# Patient Record
Sex: MALE | Race: WHITE | NOT HISPANIC OR LATINO | Employment: FULL TIME | URBAN - METROPOLITAN AREA
[De-identification: names, ages, dates, MRNs, and addresses within clinical notes are randomized per-mention and may not be internally consistent; named-entity substitution may affect disease eponyms.]

---

## 2018-06-05 ENCOUNTER — OFFICE VISIT (OUTPATIENT)
Dept: PULMONOLOGY | Facility: CLINIC | Age: 63
End: 2018-06-05
Payer: COMMERCIAL

## 2018-06-05 ENCOUNTER — HOSPITAL ENCOUNTER (OUTPATIENT)
Dept: CARDIOLOGY | Facility: CLINIC | Age: 63
Discharge: HOME | End: 2018-06-05
Payer: COMMERCIAL

## 2018-06-05 ENCOUNTER — CLINICAL SUPPORT (OUTPATIENT)
Dept: INTERNAL MEDICINE | Facility: CLINIC | Age: 63
End: 2018-06-05
Payer: COMMERCIAL

## 2018-06-05 VITALS
DIASTOLIC BLOOD PRESSURE: 73 MMHG | HEART RATE: 74 BPM | WEIGHT: 207 LBS | HEIGHT: 69 IN | BODY MASS INDEX: 30.66 KG/M2 | SYSTOLIC BLOOD PRESSURE: 109 MMHG

## 2018-06-05 DIAGNOSIS — Z00.00 ROUTINE GENERAL MEDICAL EXAMINATION AT A HEALTH CARE FACILITY: Primary | ICD-10-CM

## 2018-06-05 DIAGNOSIS — Z00.00 ANNUAL PHYSICAL EXAM: Primary | ICD-10-CM

## 2018-06-05 DIAGNOSIS — Z00.00 ROUTINE GENERAL MEDICAL EXAMINATION AT A HEALTH CARE FACILITY: ICD-10-CM

## 2018-06-05 DIAGNOSIS — E03.9 ACQUIRED HYPOTHYROIDISM: ICD-10-CM

## 2018-06-05 DIAGNOSIS — N52.01 ERECTILE DYSFUNCTION DUE TO ARTERIAL INSUFFICIENCY: ICD-10-CM

## 2018-06-05 LAB
ALBUMIN SERPL BCP-MCNC: 4.1 G/DL
ALP SERPL-CCNC: 59 U/L
ALT SERPL W/O P-5'-P-CCNC: 13 U/L
ANION GAP SERPL CALC-SCNC: 8 MMOL/L
AST SERPL-CCNC: 13 U/L
BILIRUB SERPL-MCNC: 0.9 MG/DL
BUN SERPL-MCNC: 13 MG/DL
CALCIUM SERPL-MCNC: 9.4 MG/DL
CHLORIDE SERPL-SCNC: 107 MMOL/L
CHOLEST SERPL-MCNC: 219 MG/DL
CHOLEST/HDLC SERPL: 4.2 {RATIO}
CO2 SERPL-SCNC: 25 MMOL/L
COMPLEXED PSA SERPL-MCNC: 1.1 NG/ML
CREAT SERPL-MCNC: 1 MG/DL
ERYTHROCYTE [DISTWIDTH] IN BLOOD BY AUTOMATED COUNT: 12.5 %
EST. GFR  (AFRICAN AMERICAN): >60 ML/MIN/1.73 M^2
EST. GFR  (NON AFRICAN AMERICAN): >60 ML/MIN/1.73 M^2
ESTIMATED AVG GLUCOSE: 100 MG/DL
GLUCOSE SERPL-MCNC: 99 MG/DL
HBA1C MFR BLD HPLC: 5.1 %
HCT VFR BLD AUTO: 42.3 %
HDLC SERPL-MCNC: 52 MG/DL
HDLC SERPL: 23.7 %
HGB BLD-MCNC: 14.8 G/DL
LDLC SERPL CALC-MCNC: 147.2 MG/DL
MCH RBC QN AUTO: 32.5 PG
MCHC RBC AUTO-ENTMCNC: 35 G/DL
MCV RBC AUTO: 93 FL
NONHDLC SERPL-MCNC: 167 MG/DL
PLATELET # BLD AUTO: 223 K/UL
PMV BLD AUTO: 9.4 FL
POTASSIUM SERPL-SCNC: 4.4 MMOL/L
PROT SERPL-MCNC: 7.1 G/DL
RBC # BLD AUTO: 4.56 M/UL
SODIUM SERPL-SCNC: 140 MMOL/L
TRIGL SERPL-MCNC: 99 MG/DL
TSH SERPL DL<=0.005 MIU/L-ACNC: 1.89 UIU/ML
URATE SERPL-MCNC: 8.6 MG/DL
WBC # BLD AUTO: 5.22 K/UL

## 2018-06-05 PROCEDURE — 83036 HEMOGLOBIN GLYCOSYLATED A1C: CPT

## 2018-06-05 PROCEDURE — 85027 COMPLETE CBC AUTOMATED: CPT

## 2018-06-05 PROCEDURE — 99396 PREV VISIT EST AGE 40-64: CPT | Mod: S$GLB,,, | Performed by: INTERNAL MEDICINE

## 2018-06-05 PROCEDURE — 80053 COMPREHEN METABOLIC PANEL: CPT

## 2018-06-05 PROCEDURE — 84153 ASSAY OF PSA TOTAL: CPT

## 2018-06-05 PROCEDURE — 84443 ASSAY THYROID STIM HORMONE: CPT

## 2018-06-05 PROCEDURE — 93000 ELECTROCARDIOGRAM COMPLETE: CPT | Mod: S$GLB,,, | Performed by: INTERNAL MEDICINE

## 2018-06-05 PROCEDURE — 80061 LIPID PANEL: CPT

## 2018-06-05 PROCEDURE — 84550 ASSAY OF BLOOD/URIC ACID: CPT

## 2018-06-05 PROCEDURE — 99999 PR PBB SHADOW E&M-EST. PATIENT-LVL III: CPT | Mod: PBBFAC,,, | Performed by: INTERNAL MEDICINE

## 2018-06-05 RX ORDER — SILDENAFIL 100 MG/1
100 TABLET, FILM COATED ORAL DAILY PRN
Qty: 12 TABLET | Refills: 3 | Status: ON HOLD | OUTPATIENT
Start: 2018-06-05 | End: 2020-04-05 | Stop reason: HOSPADM

## 2018-06-05 NOTE — LETTER
June 5, 2018    Rashawn SLADE Suhas  [address]  [facility] [address]             [facility]  [address]  [facility] [address]  Phone: [phone] Dear Rene,       Thank you for allowing me to serve you and perform your [facility] exam on 6/5/2018. This letter will serve as a brief summary of the physical findings and laboratory/studies performed and recommendations at this time. Except for a mild elevation of the cholesterol this is a normal exam. Try the viagra and if you prefer and I can switch you to low dose daily cialis.         If you have any questions or concerns, please don't hesitate to call.    Sincerely,        Jax Rodriguez MD

## 2018-06-05 NOTE — PROGRESS NOTES
Subjective:       Patient ID: Rashawn Dai is a 62 y.o. male.    Chief Complaint: Annual Exam    HPI  63 yo quan bang with [employer] comes for his bi annual health exam. He has had two self limited bouts of gout in his foot. Responded to a steroid injection. Not on maintenance treatment. No other medical complaints.   Review of Systems   Constitutional: Negative.    HENT: Negative.    Eyes: Negative.    Respiratory: Negative.    Cardiovascular: Negative.    Gastrointestinal: Negative.    Endocrine:        Chronic synthroid replacement   Genitourinary: Negative.         Mild ED will give a trial of viagra, He may prefer low dose cialis.   Musculoskeletal: Negative.         2 attacks of mild gout.   Skin: Negative.    Neurological: Negative.    Psychiatric/Behavioral: Negative.    All other systems reviewed and are negative.      Objective:      Physical Exam   Constitutional: He is oriented to person, place, and time. He appears well-developed and well-nourished.   HENT:   Head: Normocephalic and atraumatic.   Right Ear: External ear normal.   Left Ear: External ear normal.   Eyes: Conjunctivae and EOM are normal. Pupils are equal, round, and reactive to light.   Neck: Normal range of motion. Neck supple.   Cardiovascular: Normal rate, regular rhythm and normal heart sounds.    Pulmonary/Chest: Effort normal and breath sounds normal.   Peak flow 600 l/min   Abdominal: Soft. Bowel sounds are normal.   Musculoskeletal: Normal range of motion.   Neurological: He is alert and oriented to person, place, and time. He has normal reflexes.   Skin: Skin is warm and dry.   Psychiatric: He has a normal mood and affect. His behavior is normal. Judgment and thought content normal.       Assessment:       1. Erectile dysfunction due to arterial insufficiency    2. Acquired hypothyroidism        Plan:        Labs: Cholesterol: 219, all other parameters are normal.  IMP: Mild type II hyperlipidemia

## 2018-06-06 DIAGNOSIS — E03.9 ACQUIRED HYPOTHYROIDISM: ICD-10-CM

## 2018-06-07 RX ORDER — LEVOTHYROXINE SODIUM 100 UG/1
TABLET ORAL
Qty: 30 TABLET | Refills: 10 | Status: SHIPPED | OUTPATIENT
Start: 2018-06-07 | End: 2020-07-22

## 2020-03-31 ENCOUNTER — HOSPITAL ENCOUNTER (INPATIENT)
Facility: HOSPITAL | Age: 65
LOS: 5 days | Discharge: HOME | DRG: 177 | End: 2020-04-05
Attending: EMERGENCY MEDICINE | Admitting: INTERNAL MEDICINE
Payer: COMMERCIAL

## 2020-03-31 DIAGNOSIS — I48.91 A-FIB: ICD-10-CM

## 2020-03-31 DIAGNOSIS — R06.02 SOB (SHORTNESS OF BREATH): ICD-10-CM

## 2020-03-31 DIAGNOSIS — J18.9 ATYPICAL PNEUMONIA: Primary | ICD-10-CM

## 2020-03-31 DIAGNOSIS — U07.1 COVID-19 VIRUS INFECTION: ICD-10-CM

## 2020-03-31 DIAGNOSIS — R07.9 CHEST PAIN: ICD-10-CM

## 2020-03-31 DIAGNOSIS — I48.91 ATRIAL FIBRILLATION: ICD-10-CM

## 2020-03-31 DIAGNOSIS — R00.0 SINUS TACHYCARDIA: ICD-10-CM

## 2020-03-31 DIAGNOSIS — I48.91 ATRIAL FIBRILLATION WITH RVR: ICD-10-CM

## 2020-03-31 DIAGNOSIS — R00.0 TACHYCARDIA: ICD-10-CM

## 2020-03-31 PROBLEM — Z20.822 SUSPECTED COVID-19 VIRUS INFECTION: Status: ACTIVE | Noted: 2020-03-31

## 2020-03-31 LAB
ALBUMIN SERPL BCP-MCNC: 3.4 G/DL (ref 3.5–5.2)
ALP SERPL-CCNC: 67 U/L (ref 55–135)
ALT SERPL W/O P-5'-P-CCNC: 65 U/L (ref 10–44)
ANION GAP SERPL CALC-SCNC: 15 MMOL/L (ref 8–16)
AST SERPL-CCNC: 83 U/L (ref 10–40)
BASOPHILS # BLD AUTO: 0.02 K/UL (ref 0–0.2)
BASOPHILS NFR BLD: 0.2 % (ref 0–1.9)
BILIRUB SERPL-MCNC: 1 MG/DL (ref 0.1–1)
BUN SERPL-MCNC: 19 MG/DL (ref 8–23)
CALCIUM SERPL-MCNC: 8.7 MG/DL (ref 8.7–10.5)
CHLORIDE SERPL-SCNC: 94 MMOL/L (ref 95–110)
CO2 SERPL-SCNC: 22 MMOL/L (ref 23–29)
CREAT SERPL-MCNC: 1.2 MG/DL (ref 0.5–1.4)
CRP SERPL-MCNC: 25.08 MG/DL (ref 0–0.75)
D DIMER PPP IA.FEU-MCNC: 1.25 UG/ML FEU
DIFFERENTIAL METHOD: ABNORMAL
EOSINOPHIL # BLD AUTO: 0 K/UL (ref 0–0.5)
EOSINOPHIL NFR BLD: 0 % (ref 0–8)
ERYTHROCYTE [DISTWIDTH] IN BLOOD BY AUTOMATED COUNT: 11.7 % (ref 11.5–14.5)
EST. GFR  (AFRICAN AMERICAN): >60 ML/MIN/1.73 M^2
EST. GFR  (NON AFRICAN AMERICAN): >60 ML/MIN/1.73 M^2
FERRITIN SERPL-MCNC: 3143 NG/ML (ref 20–300)
GLUCOSE SERPL-MCNC: 148 MG/DL (ref 70–110)
HCT VFR BLD AUTO: 43.5 % (ref 40–54)
HGB BLD-MCNC: 15.7 G/DL (ref 14–18)
IMM GRANULOCYTES # BLD AUTO: 0.2 K/UL (ref 0–0.04)
IMM GRANULOCYTES NFR BLD AUTO: 1.7 % (ref 0–0.5)
INR PPP: 1.1
LACTATE SERPL-SCNC: 2 MMOL/L (ref 0.5–1.9)
LYMPHOCYTES # BLD AUTO: 0.6 K/UL (ref 1–4.8)
LYMPHOCYTES NFR BLD: 5 % (ref 18–48)
MAGNESIUM SERPL-MCNC: 2 MG/DL (ref 1.6–2.6)
MCH RBC QN AUTO: 33.2 PG (ref 27–31)
MCHC RBC AUTO-ENTMCNC: 36.1 G/DL (ref 32–36)
MCV RBC AUTO: 92 FL (ref 82–98)
MONOCYTES # BLD AUTO: 0.3 K/UL (ref 0.3–1)
MONOCYTES NFR BLD: 2.4 % (ref 4–15)
NEUTROPHILS # BLD AUTO: 10.6 K/UL (ref 1.8–7.7)
NEUTROPHILS NFR BLD: 90.7 % (ref 38–73)
NRBC BLD-RTO: 0 /100 WBC
PLATELET # BLD AUTO: 298 K/UL (ref 150–350)
PMV BLD AUTO: 9.7 FL (ref 9.2–12.9)
POTASSIUM SERPL-SCNC: 3.3 MMOL/L (ref 3.5–5.1)
PROCALCITONIN SERPL IA-MCNC: 0.87 NG/ML (ref 0–0.5)
PROT SERPL-MCNC: 7.9 G/DL (ref 6–8.4)
PROTHROMBIN TIME: 13.6 SEC (ref 10.6–14.8)
RBC # BLD AUTO: 4.73 M/UL (ref 4.6–6.2)
SODIUM SERPL-SCNC: 131 MMOL/L (ref 136–145)
TROPONIN I SERPL DL<=0.01 NG/ML-MCNC: <0.03 NG/ML
WBC # BLD AUTO: 11.65 K/UL (ref 3.9–12.7)

## 2020-03-31 PROCEDURE — 87209 SMEAR COMPLEX STAIN: CPT

## 2020-03-31 PROCEDURE — 63600175 PHARM REV CODE 636 W HCPCS: Performed by: EMERGENCY MEDICINE

## 2020-03-31 PROCEDURE — 85379 FIBRIN DEGRADATION QUANT: CPT

## 2020-03-31 PROCEDURE — 63600175 PHARM REV CODE 636 W HCPCS: Performed by: NURSE PRACTITIONER

## 2020-03-31 PROCEDURE — 80053 COMPREHEN METABOLIC PANEL: CPT

## 2020-03-31 PROCEDURE — 83735 ASSAY OF MAGNESIUM: CPT

## 2020-03-31 PROCEDURE — 87040 BLOOD CULTURE FOR BACTERIA: CPT

## 2020-03-31 PROCEDURE — 85610 PROTHROMBIN TIME: CPT

## 2020-03-31 PROCEDURE — 25000003 PHARM REV CODE 250: Performed by: EMERGENCY MEDICINE

## 2020-03-31 PROCEDURE — 87324 CLOSTRIDIUM AG IA: CPT

## 2020-03-31 PROCEDURE — 25000003 PHARM REV CODE 250: Performed by: INTERNAL MEDICINE

## 2020-03-31 PROCEDURE — 93005 ELECTROCARDIOGRAM TRACING: CPT | Performed by: INTERNAL MEDICINE

## 2020-03-31 PROCEDURE — 94761 N-INVAS EAR/PLS OXIMETRY MLT: CPT

## 2020-03-31 PROCEDURE — U0002 COVID-19 LAB TEST NON-CDC: HCPCS

## 2020-03-31 PROCEDURE — 87046 STOOL CULTR AEROBIC BACT EA: CPT | Mod: 59

## 2020-03-31 PROCEDURE — 20000000 HC ICU ROOM

## 2020-03-31 PROCEDURE — 86140 C-REACTIVE PROTEIN: CPT

## 2020-03-31 PROCEDURE — 84484 ASSAY OF TROPONIN QUANT: CPT

## 2020-03-31 PROCEDURE — 83605 ASSAY OF LACTIC ACID: CPT

## 2020-03-31 PROCEDURE — 87449 NOS EACH ORGANISM AG IA: CPT

## 2020-03-31 PROCEDURE — 87045 FECES CULTURE AEROBIC BACT: CPT

## 2020-03-31 PROCEDURE — 36415 COLL VENOUS BLD VENIPUNCTURE: CPT

## 2020-03-31 PROCEDURE — 89055 LEUKOCYTE ASSESSMENT FECAL: CPT

## 2020-03-31 PROCEDURE — 96365 THER/PROPH/DIAG IV INF INIT: CPT

## 2020-03-31 PROCEDURE — 84145 PROCALCITONIN (PCT): CPT

## 2020-03-31 PROCEDURE — 87015 SPECIMEN INFECT AGNT CONCNTJ: CPT

## 2020-03-31 PROCEDURE — 82728 ASSAY OF FERRITIN: CPT

## 2020-03-31 PROCEDURE — 63700000 PHARM REV CODE 250 ALT 637 W/O HCPCS: Performed by: INTERNAL MEDICINE

## 2020-03-31 PROCEDURE — 85025 COMPLETE CBC W/AUTO DIFF WBC: CPT

## 2020-03-31 PROCEDURE — 99285 EMERGENCY DEPT VISIT HI MDM: CPT | Mod: 25

## 2020-03-31 PROCEDURE — 63700000 PHARM REV CODE 250 ALT 637 W/O HCPCS: Performed by: EMERGENCY MEDICINE

## 2020-03-31 RX ORDER — LANOLIN ALCOHOL/MO/W.PET/CERES
800 CREAM (GRAM) TOPICAL
Status: DISCONTINUED | OUTPATIENT
Start: 2020-03-31 | End: 2020-04-05 | Stop reason: HOSPADM

## 2020-03-31 RX ORDER — POTASSIUM CHLORIDE 20 MEQ/15ML
40 SOLUTION ORAL
Status: DISCONTINUED | OUTPATIENT
Start: 2020-03-31 | End: 2020-04-05 | Stop reason: HOSPADM

## 2020-03-31 RX ORDER — HYDROXYCHLOROQUINE SULFATE 200 MG/1
400 TABLET, FILM COATED ORAL 2 TIMES DAILY
Status: COMPLETED | OUTPATIENT
Start: 2020-03-31 | End: 2020-03-31

## 2020-03-31 RX ORDER — AZITHROMYCIN 250 MG/1
500 TABLET, FILM COATED ORAL DAILY
Status: ON HOLD | COMMUNITY
Start: 2020-03-30 | End: 2020-04-05 | Stop reason: HOSPADM

## 2020-03-31 RX ORDER — ENOXAPARIN SODIUM 100 MG/ML
40 INJECTION SUBCUTANEOUS EVERY 24 HOURS
Status: DISCONTINUED | OUTPATIENT
Start: 2020-03-31 | End: 2020-04-04

## 2020-03-31 RX ORDER — SODIUM,POTASSIUM PHOSPHATES 280-250MG
2 POWDER IN PACKET (EA) ORAL
Status: DISCONTINUED | OUTPATIENT
Start: 2020-03-31 | End: 2020-04-05 | Stop reason: HOSPADM

## 2020-03-31 RX ORDER — METOPROLOL TARTRATE 1 MG/ML
5 INJECTION, SOLUTION INTRAVENOUS
Status: COMPLETED | OUTPATIENT
Start: 2020-03-31 | End: 2020-03-31

## 2020-03-31 RX ORDER — AZITHROMYCIN 250 MG/1
500 TABLET, FILM COATED ORAL
Status: COMPLETED | OUTPATIENT
Start: 2020-03-31 | End: 2020-03-31

## 2020-03-31 RX ORDER — ACETAMINOPHEN 325 MG/1
650 TABLET ORAL EVERY 8 HOURS PRN
Status: DISCONTINUED | OUTPATIENT
Start: 2020-03-31 | End: 2020-04-05 | Stop reason: HOSPADM

## 2020-03-31 RX ORDER — ACETAMINOPHEN 325 MG/1
650 TABLET ORAL EVERY 4 HOURS PRN
Status: DISCONTINUED | OUTPATIENT
Start: 2020-03-31 | End: 2020-03-31

## 2020-03-31 RX ORDER — BENZONATATE 100 MG/1
100 CAPSULE ORAL 3 TIMES DAILY PRN
COMMUNITY

## 2020-03-31 RX ORDER — POTASSIUM CHLORIDE 20 MEQ/15ML
40 SOLUTION ORAL
Status: COMPLETED | OUTPATIENT
Start: 2020-03-31 | End: 2020-03-31

## 2020-03-31 RX ORDER — ONDANSETRON 2 MG/ML
4 INJECTION INTRAMUSCULAR; INTRAVENOUS EVERY 6 HOURS PRN
Status: DISCONTINUED | OUTPATIENT
Start: 2020-03-31 | End: 2020-04-05 | Stop reason: HOSPADM

## 2020-03-31 RX ORDER — POTASSIUM CHLORIDE 20 MEQ/15ML
40 SOLUTION ORAL ONCE
Status: COMPLETED | OUTPATIENT
Start: 2020-03-31 | End: 2020-04-01

## 2020-03-31 RX ORDER — NITAZOXANIDE 500 MG/1
500 TABLET ORAL EVERY 12 HOURS
Status: COMPLETED | OUTPATIENT
Start: 2020-03-31 | End: 2020-04-03

## 2020-03-31 RX ORDER — HYDROXYCHLOROQUINE SULFATE 200 MG/1
400 TABLET, FILM COATED ORAL DAILY
Status: DISCONTINUED | OUTPATIENT
Start: 2020-04-01 | End: 2020-04-05 | Stop reason: HOSPADM

## 2020-03-31 RX ORDER — AZITHROMYCIN 250 MG/1
250 TABLET, FILM COATED ORAL DAILY
Status: COMPLETED | OUTPATIENT
Start: 2020-04-01 | End: 2020-04-04

## 2020-03-31 RX ORDER — SODIUM CHLORIDE 0.9 % (FLUSH) 0.9 %
10 SYRINGE (ML) INJECTION
Status: DISCONTINUED | OUTPATIENT
Start: 2020-03-31 | End: 2020-04-05 | Stop reason: HOSPADM

## 2020-03-31 RX ORDER — ZOLPIDEM TARTRATE 5 MG/1
5 TABLET ORAL NIGHTLY PRN
Status: DISCONTINUED | OUTPATIENT
Start: 2020-03-31 | End: 2020-04-05 | Stop reason: HOSPADM

## 2020-03-31 RX ORDER — SODIUM CHLORIDE 9 MG/ML
500 INJECTION, SOLUTION INTRAVENOUS
Status: COMPLETED | OUTPATIENT
Start: 2020-03-31 | End: 2020-03-31

## 2020-03-31 RX ORDER — ACETAMINOPHEN 325 MG/1
650 TABLET ORAL
Status: COMPLETED | OUTPATIENT
Start: 2020-03-31 | End: 2020-03-31

## 2020-03-31 RX ORDER — LEVOTHYROXINE SODIUM 100 UG/1
100 TABLET ORAL
Status: DISCONTINUED | OUTPATIENT
Start: 2020-04-01 | End: 2020-04-05 | Stop reason: HOSPADM

## 2020-03-31 RX ADMIN — ACETAMINOPHEN 650 MG: 325 TABLET ORAL at 02:03

## 2020-03-31 RX ADMIN — SODIUM CHLORIDE 1000 ML: 9 INJECTION, SOLUTION INTRAVENOUS at 02:03

## 2020-03-31 RX ADMIN — CEFTRIAXONE 2 G: 2 INJECTION, SOLUTION INTRAVENOUS at 05:03

## 2020-03-31 RX ADMIN — NITAZOXANIDE 500 MG: 500 TABLET ORAL at 08:03

## 2020-03-31 RX ADMIN — SODIUM CHLORIDE 500 ML: 0.9 INJECTION, SOLUTION INTRAVENOUS at 12:03

## 2020-03-31 RX ADMIN — METOPROLOL TARTRATE 5 MG: 1 INJECTION, SOLUTION INTRAVENOUS at 04:03

## 2020-03-31 RX ADMIN — HYDROXYCHLOROQUINE SULFATE 400 MG: 200 TABLET, FILM COATED ORAL at 08:03

## 2020-03-31 RX ADMIN — HYDROXYCHLOROQUINE SULFATE 400 MG: 200 TABLET, FILM COATED ORAL at 01:03

## 2020-03-31 RX ADMIN — POTASSIUM CHLORIDE 40 MEQ: 20 SOLUTION ORAL at 02:03

## 2020-03-31 RX ADMIN — ZOLPIDEM TARTRATE 5 MG: 5 TABLET ORAL at 09:03

## 2020-03-31 RX ADMIN — AZITHROMYCIN MONOHYDRATE 500 MG: 250 TABLET ORAL at 12:03

## 2020-03-31 RX ADMIN — CEFTRIAXONE 1 G: 1 INJECTION, SOLUTION INTRAVENOUS at 12:03

## 2020-03-31 RX ADMIN — ENOXAPARIN SODIUM 40 MG: 100 INJECTION SUBCUTANEOUS at 08:03

## 2020-03-31 NOTE — ED NOTES
DR FIELDS MADE AWARE OF CHRISTIAN, HR, TEMP, DIARRHEA X 8 DAYS, PT C/O EXTREME THIRST.  TO GIVE 650 MG PO TYLENOL; SEE EMAR.

## 2020-03-31 NOTE — ED PROVIDER NOTES
Encounter Date: 3/31/2020       History     Chief Complaint   Patient presents with    Headache     X 1 WEEK, POS SCREEN    Diarrhea    Shortness of Breath     TODAY    Fever     Patient presents complaining of headache, fever, shortness of breath ongoing for the last 1 week.  Patient persisting with fever body aches and cough today with associated shortness of breath.  Patient has been taking Tylenol for fever.  At the worst symptoms are moderate.  He has not had this before.        Review of patient's allergies indicates:   Allergen Reactions    Ibuprofen Hives     Past Medical History:   Diagnosis Date    Thyroid disease      No past surgical history on file.  Family History   Problem Relation Age of Onset    Heart failure Mother     Cancer Father      Social History     Tobacco Use    Smoking status: Never Smoker    Smokeless tobacco: Never Used   Substance Use Topics    Alcohol use: Yes     Alcohol/week: 23.5 standard drinks     Types: 21 Glasses of wine, 3 Standard drinks or equivalent per week    Drug use: No     Review of Systems   Constitutional: Positive for fever.   Respiratory: Positive for cough and shortness of breath.    All other systems reviewed and are negative.      Physical Exam     Initial Vitals [03/31/20 1053]   BP Pulse Resp Temp SpO2   135/88 (!) 123 (!) 24 (!) 103.3 °F (39.6 °C) 95 %      MAP       --         Physical Exam    Nursing note and vitals reviewed.  Constitutional: He appears well-developed and well-nourished. He is not diaphoretic. No distress.   Appears weak   HENT:   Head: Normocephalic and atraumatic.   Eyes: EOM are normal.   Neck: Normal range of motion. Neck supple.   Cardiovascular: Normal rate, regular rhythm, normal heart sounds and intact distal pulses.   Pulmonary/Chest: No respiratory distress. He has wheezes.   Abdominal: Soft.   Musculoskeletal: Normal range of motion.   Neurological: He is alert and oriented to person, place, and time.   Skin: Skin is  warm and dry.   Psychiatric: He has a normal mood and affect. His behavior is normal. Judgment and thought content normal.         ED Course   Procedures  Labs Reviewed   COMPREHENSIVE METABOLIC PANEL - Abnormal; Notable for the following components:       Result Value    Sodium 131 (*)     Potassium 3.3 (*)     Chloride 94 (*)     CO2 22 (*)     Glucose 148 (*)     Albumin 3.4 (*)     AST 83 (*)     ALT 65 (*)     All other components within normal limits   CBC W/ AUTO DIFFERENTIAL - Abnormal; Notable for the following components:    Mean Corpuscular Hemoglobin 33.2 (*)     Mean Corpuscular Hemoglobin Conc 36.1 (*)     Immature Granulocytes 1.7 (*)     Gran # (ANC) 10.6 (*)     Immature Grans (Abs) 0.20 (*)     Lymph # 0.6 (*)     Gran% 90.7 (*)     Lymph% 5.0 (*)     Mono% 2.4 (*)     All other components within normal limits   LACTIC ACID, PLASMA - Abnormal; Notable for the following components:    Lactate (Lactic Acid) 2.0 (*)     All other components within normal limits    Narrative:     Lactic Acid critical result(s) called and verbal readback obtained   from [facility]  by KB5 03/31/2020 13:15   CULTURE, BLOOD   CULTURE, BLOOD   MAGNESIUM   TROPONIN I   PROTIME-INR   SARS-COV-2 (COVID-19) QUALITATIVE PCR   PROCALCITONIN        ECG Results          EKG 12-LEAD (In process)  Result time 03/31/20 12:57:36    In process by Interface, Lab In [facility] (03/31/20 12:57:36)                 Narrative:    Test Reason : R07.9,    Vent. Rate : 115 BPM     Atrial Rate : 214 BPM     P-R Int : 000 ms          QRS Dur : 084 ms      QT Int : 300 ms       P-R-T Axes : 000 -03 -50 degrees     QTc Int : 415 ms    Atrial fibrillation with rapid ventricular response  Inferior infarct ,age undetermined  Abnormal ECG  When compared with ECG of 05-JUN-2018 08:05,  Atrial fibrillation has replaced Sinus rhythm  Vent. rate has increased BY  44 BPM  Non-specific change in ST segment in Inferior leads    Referred By: [email or web address]    SELF           Confirmed By:                             Imaging Results          X-Ray Chest AP Portable (Final result)  Result time 03/31/20 11:12:34    Final result by Jax Cook MD (03/31/20 11:12:34)                 Impression:      Right greater than left, mixed interstitial and alveolar opacities at the lung bases suggesting multifocal pneumonia (and less likely edema, ARDS or aspiration).  Consider radiographic follow-up to document resolution.    RESULT NOTIFICATION: These observations were discussed by the dictating physician, by phone with, and acknowledged by Javy Talavera at 11:09 on 3/31/2020.      Electronically signed by: Jax Cook MD  Date:    03/31/2020  Time:    11:12             Narrative:    CLINICAL HISTORY:  ([id number])65 y/o  (1955) M    Shortness of breath    TECHNIQUE:  (A#[id number], exam time 3/31/2020 11:06)    XR CHEST AP PORTABLE [id number]    COMPARISON:  Radiograph most recently from 09/22/2015    FINDINGS:  Patchy airspace opacity in the right mid to lower lung zone and interstitial opacities in the left mid to lower lung zone (ground-glass).  Costophrenic angles are seen without effusion. No pneumothorax is identified. The heart is top normal in size.  Osseous structures appear within normal limits. The visualized upper abdomen is unremarkable.                                 Medical Decision Making:   ED Management:  Patient likely with corona virus.  Patient has atypical pneumonia on chest x-ray.  Patient febrile on arrival and tachycardic.  Initial Tylenol was not given upon arrival patient will be receiving it now.  Initial 500 L of fluid has been given to patient as well as Rocephin azithromycin.  Patient will require admission into the hospital secondary to bilateral pneumonia, high risk of deterioration, tachycardia, fever.                                 Clinical Impression:       ICD-10-CM ICD-9-CM   1. Atypical pneumonia J18.9 486   2. SOB (shortness of  breath) R06.02 786.05   3. Chest pain R07.9 786.50             ED Disposition Condition    Admit                           Javy Talavera MD  03/31/20 2089

## 2020-03-31 NOTE — ASSESSMENT & PLAN NOTE
Admit to ICU  Consult ID  IV Azithromycin/Rocephin  Hydroxychloroquine  Supplemental Oxygen  Lovenox for VTE prophylaxis  Suspected Covid-19 virus; test pending

## 2020-03-31 NOTE — SUBJECTIVE & OBJECTIVE
Past Medical History:   Diagnosis Date    Thyroid disease        No past surgical history on file.    Review of patient's allergies indicates:   Allergen Reactions    Ibuprofen Hives       No current facility-administered medications on file prior to encounter.      Current Outpatient Medications on File Prior to Encounter   Medication Sig    azithromycin (ZITHROMAX Z-DEANGELO) 250 MG tablet Take 500 mg by mouth once daily.    benzonatate (TESSALON) 100 MG capsule Take 100 mg by mouth 3 (three) times daily as needed for Cough.    SYNTHROID 100 mcg tablet TAKE 1 TABLET EVERY DAY (Patient taking differently: Take 100 mcg by mouth before breakfast. )    sildenafil (VIAGRA) 100 MG tablet Take 1 tablet (100 mg total) by mouth daily as needed for Erectile Dysfunction.     Family History     Problem Relation (Age of Onset)    Cancer Father    Heart failure Mother        Tobacco Use    Smoking status: Never Smoker    Smokeless tobacco: Never Used   Substance and Sexual Activity    Alcohol use: Yes     Alcohol/week: 23.5 standard drinks     Types: 21 Glasses of wine, 3 Standard drinks or equivalent per week    Drug use: No    Sexual activity: Not on file     Review of Systems   Constitutional: Positive for activity change, appetite change, chills, fatigue and fever.   Respiratory: Positive for cough and shortness of breath.    Gastrointestinal: Positive for diarrhea.   Genitourinary: Negative for difficulty urinating and dysuria.   Musculoskeletal: Positive for myalgias.   Neurological: Positive for dizziness and headaches. Negative for seizures, speech difficulty and numbness.   Psychiatric/Behavioral: Negative for agitation and confusion.     Objective:     Vital Signs (Most Recent):  Temp: 100 °F (37.8 °C) (03/31/20 1445)  Pulse: (!) 124 (03/31/20 1445)  Resp: (!) 33 (03/31/20 1445)  BP: 128/74 (03/31/20 1400)  SpO2: (!) 93 % (03/31/20 1445) Vital Signs (24h Range):  Temp:  [100 °F (37.8 °C)-103.3 °F (39.6 °C)] 100  °F (37.8 °C)  Pulse:  [112-126] 124  Resp:  [22-33] 33  SpO2:  [92 %-96 %] 93 %  BP: (121-138)/(71-92) 128/74     Weight: 93.9 kg (207 lb)  Body mass index is 30.57 kg/m².    Physical Exam   Constitutional: He is oriented to person, place, and time. He appears well-developed and well-nourished.   HENT:   Head: Normocephalic.   Eyes: Pupils are equal, round, and reactive to light. Conjunctivae and lids are normal.   Neck: Trachea normal and normal range of motion. Neck supple.   Cardiovascular: Normal heart sounds. Tachycardia present.   Pulmonary/Chest: No accessory muscle usage. Tachypnea noted. He has wheezes.   Abdominal: Soft. Bowel sounds are normal.   Musculoskeletal: Normal range of motion.   Neurological: He is alert and oriented to person, place, and time.   Skin: Skin is warm and dry.   Psychiatric: He has a normal mood and affect. His behavior is normal.         CRANIAL NERVES     CN III, IV, VI   Pupils are equal, round, and reactive to light.       Significant Labs:   Blood Culture: No results for input(s): LABBLOO in the last 48 hours.  CBC:   Recent Labs   Lab 03/31/20  1125   WBC 11.65   HGB 15.7   HCT 43.5        CMP:   Recent Labs   Lab 03/31/20  1125   *   K 3.3*   CL 94*   CO2 22*   *   BUN 19   CREATININE 1.2   CALCIUM 8.7   PROT 7.9   ALBUMIN 3.4*   BILITOT 1.0   ALKPHOS 67   AST 83*   ALT 65*   ANIONGAP 15   EGFRNONAA >60.0     Cardiac Markers: No results for input(s): CKMB, MYOGLOBIN, BNP, TROPISTAT in the last 48 hours.  Lactic Acid:   Recent Labs   Lab 03/31/20  1125   LACTATE 2.0*     Troponin:   Recent Labs   Lab 03/31/20  1125   TROPONINI <0.030       Significant Imaging: CXR: I have reviewed all pertinent results/findings within the past 24 hours and my personal findings are:  bilateral infiltrates  I have reviewed and interpreted all pertinent imaging results/findings within the past 24 hours.

## 2020-03-31 NOTE — ASSESSMENT & PLAN NOTE
Likely secondary to dehydration  Repeat EKG now to clarify ST vs Afib with RVR  500cc bolus in the ED; additional 1L bolus now  Continuous cardiac monitoring

## 2020-03-31 NOTE — H&P
[facility] Medicine  History & Physical    Patient Name: Rashawn Dai  MRN: [id number]  Admission Date: 3/31/2020  Attending Physician: Dr. Mcguire  Primary Care Provider: Silver Rodriguez MD         Patient information was obtained from patient and ER records.     Subjective:     Principal Problem:Atypical pneumonia    Chief Complaint:   Chief Complaint   Patient presents with    Headache     X 1 WEEK, POS SCREEN    Diarrhea    Shortness of Breath     TODAY    Fever        HPI: History was obtained from the patient and ER physician Sign-out. Patient presented with headache, cough, shortness of breath and fever x 9 days. His symptoms are progressively getting worst. Other associated symptoms include diarrhea and fatigue. Denies any alleviating or worsening factors. He is a  and reports he has come into contact with coworkers who tested positive for COVID 19.  Patient was seen at outside clinic a few days ago and treated with azithromycin with no improvement. Patient decided to come to the ER for further evaluation today after becoming SOB.     In the emergency room, patient was saturating in low 90s on room air. He required 2L oxygen.  He is febrile. Patient CBC was unremarkable. CMP was unremarkable. CRP is pending. BNP and troponin was negative. Reviewed EKG which shows tachycardia 110s. HR progressed to 130s while in the ED. No QTC prolongation. He is given 500cc NS. CXR shows bilateral infiltratres consistent with COVID 19.     Decision to admit was taken and patient was informed about the plan of care.         Past Medical History:   Diagnosis Date    Thyroid disease        No past surgical history on file.    Review of patient's allergies indicates:   Allergen Reactions    Ibuprofen Hives       No current facility-administered medications on file prior to encounter.      Current Outpatient Medications on File Prior to Encounter   Medication Sig    azithromycin (ZITHROMAX  Z-DEANGELO) 250 MG tablet Take 500 mg by mouth once daily.    benzonatate (TESSALON) 100 MG capsule Take 100 mg by mouth 3 (three) times daily as needed for Cough.    SYNTHROID 100 mcg tablet TAKE 1 TABLET EVERY DAY (Patient taking differently: Take 100 mcg by mouth before breakfast. )    sildenafil (VIAGRA) 100 MG tablet Take 1 tablet (100 mg total) by mouth daily as needed for Erectile Dysfunction.     Family History     Problem Relation (Age of Onset)    Cancer Father    Heart failure Mother        Tobacco Use    Smoking status: Never Smoker    Smokeless tobacco: Never Used   Substance and Sexual Activity    Alcohol use: Yes     Alcohol/week: 23.5 standard drinks     Types: 21 Glasses of wine, 3 Standard drinks or equivalent per week    Drug use: No    Sexual activity: Not on file     Review of Systems   Constitutional: Positive for activity change, appetite change, chills, fatigue and fever.   Respiratory: Positive for cough and shortness of breath.    Gastrointestinal: Positive for diarrhea.   Genitourinary: Negative for difficulty urinating and dysuria.   Musculoskeletal: Positive for myalgias.   Neurological: Positive for dizziness and headaches. Negative for seizures, speech difficulty and numbness.   Psychiatric/Behavioral: Negative for agitation and confusion.     Objective:     Vital Signs (Most Recent):  Temp: 100 °F (37.8 °C) (03/31/20 1445)  Pulse: (!) 124 (03/31/20 1445)  Resp: (!) 33 (03/31/20 1445)  BP: 128/74 (03/31/20 1400)  SpO2: (!) 93 % (03/31/20 1445) Vital Signs (24h Range):  Temp:  [100 °F (37.8 °C)-103.3 °F (39.6 °C)] 100 °F (37.8 °C)  Pulse:  [112-126] 124  Resp:  [22-33] 33  SpO2:  [92 %-96 %] 93 %  BP: (121-138)/(71-92) 128/74     Weight: 93.9 kg (207 lb)  Body mass index is 30.57 kg/m².    Physical Exam   Constitutional: He is oriented to person, place, and time. He appears well-developed and well-nourished.   HENT:   Head: Normocephalic.   Eyes: Pupils are equal, round, and reactive  to light. Conjunctivae and lids are normal.   Neck: Trachea normal and normal range of motion. Neck supple.   Cardiovascular: Normal heart sounds. Tachycardia present.   Pulmonary/Chest: No accessory muscle usage. Tachypnea noted. He has wheezes.   Abdominal: Soft. Bowel sounds are normal.   Musculoskeletal: Normal range of motion.   Neurological: He is alert and oriented to person, place, and time.   Skin: Skin is warm and dry.   Psychiatric: He has a normal mood and affect. His behavior is normal.         CRANIAL NERVES     CN III, IV, VI   Pupils are equal, round, and reactive to light.       Significant Labs:   Blood Culture: No results for input(s): LABBLOO in the last 48 hours.  CBC:   Recent Labs   Lab 03/31/20  1125   WBC 11.65   HGB 15.7   HCT 43.5        CMP:   Recent Labs   Lab 03/31/20  1125   *   K 3.3*   CL 94*   CO2 22*   *   BUN 19   CREATININE 1.2   CALCIUM 8.7   PROT 7.9   ALBUMIN 3.4*   BILITOT 1.0   ALKPHOS 67   AST 83*   ALT 65*   ANIONGAP 15   EGFRNONAA >60.0     Cardiac Markers: No results for input(s): CKMB, MYOGLOBIN, BNP, TROPISTAT in the last 48 hours.  Lactic Acid:   Recent Labs   Lab 03/31/20  1125   LACTATE 2.0*     Troponin:   Recent Labs   Lab 03/31/20  1125   TROPONINI <0.030       Significant Imaging: CXR: I have reviewed all pertinent results/findings within the past 24 hours and my personal findings are:  bilateral infiltrates  I have reviewed and interpreted all pertinent imaging results/findings within the past 24 hours.    Assessment/Plan:     * Atypical pneumonia  Admit to ICU  Consult ID  IV Azithromycin/Rocephin  Hydroxychloroquine  Supplemental Oxygen  Lovenox for VTE prophylaxis  Suspected Covid-19 virus; test pending        Sinus tachycardia  Likely secondary to dehydration  Repeat EKG now to clarify ST vs Afib with RVR  500cc bolus in the ED; additional 1L bolus now  Continuous cardiac monitoring      Suspected Covid-19 Virus Infection  Test  pending  Treatment as above      Shortness of breath  Likely due to above  Supplemental O2        Sepsis:  Possibly secondary to Covid-19 infection  Treatment as above    Afib with RVR:  Likely secondary to dehydration and infection  IVF given with improvement of HR  IV metoprolol x1 now; will evaluate effectiveness and start drip if needed    VTE Risk Mitigation (From admission, onward)         Ordered     enoxaparin injection 40 mg  Daily      03/31/20 1506     IP VTE HIGH RISK PATIENT  Once      03/31/20 1506                   Malika Almanzar NP  Department of Hospital Medicine   [facility]

## 2020-03-31 NOTE — HPI
History was obtained from the patient and ER physician Sign-out. Patient presented with headache, cough, shortness of breath and fever x 9 days. His symptoms are progressively getting worst. Other associated symptoms include diarrhea and fatigue. Denies any alleviating or worsening factors. He is a  and reports he has come into contact with coworkers who tested positive for COVID 19.  Patient was seen at outside clinic a few days ago and treated with azithromycin with no improvement. Patient decided to come to the ER for further evaluation today after becoming SOB.     In the emergency room, patient was saturating in low 90s on room air. He required 2L oxygen.  He is febrile. Patient CBC was unremarkable. CMP was unremarkable. CRP is pending. BNP and troponin was negative. Reviewed EKG which shows tachycardia 110s. HR progressed to 130s while in the ED. No QTC prolongation. He is given 500cc NS. CXR shows bilateral infiltratres consistent with COVID 19.     Decision to admit was taken and patient was informed about the plan of care.

## 2020-03-31 NOTE — CONSULTS
Consult Note  Infectious Disease    Reason for Consult:  COVID  This is a remote consult, inter profession  discussion  due to COVID   pandemic, time 33 min    HPI: Rashawn Dai is a  64 y.o. male with past medical history of thyroid disease, works as a , came in complaining of diarrhea of about 1 week duration, progressively getting shortness of breath, generalized weakness, myalgias, nonproductive cough and a fever of 103, changes to appetite.  He was recently seen as outpatient and was given Zithromax.  No improvement noted.  In ER patient was saturated the low 90s on room air.  He was also febrile and tachycardic.    Discussed with hospitalist.      Review of patient's allergies indicates:   Allergen Reactions    Ibuprofen Hives     Past Medical History:   Diagnosis Date    Thyroid disease      No past surgical history on file.  Social History     Socioeconomic History    Marital status:      Spouse name: Not on file    Number of children: Not on file    Years of education: Not on file    Highest education level: Not on file   Occupational History    Not on file   Social Needs    Financial resource strain: Not on file    Food insecurity:     Worry: Not on file     Inability: Not on file    Transportation needs:     Medical: Not on file     Non-medical: Not on file   Tobacco Use    Smoking status: Never Smoker    Smokeless tobacco: Never Used   Substance and Sexual Activity    Alcohol use: Yes     Alcohol/week: 23.5 standard drinks     Types: 21 Glasses of wine, 3 Standard drinks or equivalent per week    Drug use: No    Sexual activity: Not on file   Lifestyle    Physical activity:     Days per week: Not on file     Minutes per session: Not on file    Stress: Not on file   Relationships    Social connections:     Talks on phone: Not on file     Gets together: Not on file     Attends Caodaism service: Not on file     Active member of club or organization: Not on file      Attends meetings of clubs or organizations: Not on file     Relationship status: Not on file   Other Topics Concern    Not on file   Social History Narrative    Not on file     Family History   Problem Relation Age of Onset    Heart failure Mother     Cancer Father        Review of Systems:   na    EXAM & DIAGNOSTICS REVIEWED:   Vitals:     Temp:  [100 °F (37.8 °C)-103.3 °F (39.6 °C)]   Temp: 100 °F (37.8 °C) (03/31/20 1445)  Pulse: (!) 124 (03/31/20 1445)  Resp: (!) 33 (03/31/20 1445)  BP: 128/74 (03/31/20 1400)  SpO2: (!) 93 % (03/31/20 1445)    Intake/Output Summary (Last 24 hours) at 3/31/2020 1506  Last data filed at 3/31/2020 1245  Gross per 24 hour   Intake 550 ml   Output --   Net 550 ml       Lines/Tubes/Drains:    General Labs reviewed:  Recent Labs   Lab 03/31/20  1125   WBC 11.65   HGB 15.7   HCT 43.5          Recent Labs   Lab 03/31/20  1125   *   K 3.3*   CL 94*   CO2 22*   BUN 19   CREATININE 1.2   CALCIUM 8.7   PROT 7.9   BILITOT 1.0   ALKPHOS 67   ALT 65*   AST 83*           Micro:  Microbiology Results (last 7 days)     Procedure Component Value Units Date/Time    Blood culture [[id number]] Collected:  03/31/20 1128    Order Status:  Sent Specimen:  Blood from Peripheral, Antecubital, Left Updated:  03/31/20 1202    Blood culture [[id number]] Collected:  03/31/20 1125    Order Status:  Sent Specimen:  Blood from Peripheral, Antecubital, Left Updated:  03/31/20 1202        Imaging Reviewed:   CXRRight greater than left, mixed interstitial and alveolar opacities at the lung bases suggesting multifocal pneumonia (and less likely edema, ARDS or aspiration).  Consider radiographic follow-up to document resolution.  Cardiology:   QTc Int : 415 ms    IMPRESSION & PLAN     Febrile illness, pneumonia. Possible COVID  Hyponatremia  Transaminitis  Diarrhea    Lymphocytes 0.6  Procalcitonin 0.87    QTc Int : 415 ms  ferritin pending  crp pending  Ferritin 3143  D-dimer 1.25  CRP 25    This is a  remote  consult, inter profession  discussion  due to COVID   pandemic, time 33 min    Recommendations:  Ceftriaxone for the next few days given borderline procalcitonin.  Zithromax 500 x 1 than 250 p.o. daily for 4 days  Hydroxychloroquine 400 mg twice a day on 1st day then 400 mg daily for 4 days  If patient improves slowly;  can extend the above course to a total of 7 days  Monitor  QTC while on the above medication    I will send stool studies, please follow  Haylee b.i.d. x3 days.    Will sign off

## 2020-04-01 PROBLEM — U07.1 COVID-19 VIRUS INFECTION: Status: ACTIVE | Noted: 2020-03-31

## 2020-04-01 PROBLEM — U07.1 PNEUMONIA DUE TO COVID-19 VIRUS: Status: ACTIVE | Noted: 2020-04-01

## 2020-04-01 PROBLEM — R19.7 DIARRHEA: Status: ACTIVE | Noted: 2020-04-01

## 2020-04-01 PROBLEM — R74.01 TRANSAMINITIS: Status: ACTIVE | Noted: 2020-04-01

## 2020-04-01 PROBLEM — I48.91 ATRIAL FIBRILLATION WITH RVR: Status: ACTIVE | Noted: 2020-04-01

## 2020-04-01 PROBLEM — E87.6 HYPOKALEMIA: Status: ACTIVE | Noted: 2020-04-01

## 2020-04-01 PROBLEM — J12.82 PNEUMONIA DUE TO COVID-19 VIRUS: Status: ACTIVE | Noted: 2020-04-01

## 2020-04-01 PROBLEM — E87.1 HYPONATREMIA: Status: ACTIVE | Noted: 2020-04-01

## 2020-04-01 LAB
ANION GAP SERPL CALC-SCNC: 14 MMOL/L (ref 8–16)
ANISOCYTOSIS BLD QL SMEAR: SLIGHT
BACTERIA SPEC AEROBE CULT: NORMAL
BASOPHILS NFR BLD: 0 % (ref 0–1.9)
BUN SERPL-MCNC: 15 MG/DL (ref 8–23)
C DIFF GDH STL QL: NEGATIVE
C DIFF TOX A+B STL QL IA: NEGATIVE
CALCIUM SERPL-MCNC: 8.1 MG/DL (ref 8.7–10.5)
CHLORIDE SERPL-SCNC: 100 MMOL/L (ref 95–110)
CO2 SERPL-SCNC: 16 MMOL/L (ref 23–29)
CREAT SERPL-MCNC: 1 MG/DL (ref 0.5–1.4)
DIFFERENTIAL METHOD: ABNORMAL
EOSINOPHIL NFR BLD: 0 % (ref 0–8)
ERYTHROCYTE [DISTWIDTH] IN BLOOD BY AUTOMATED COUNT: 11.8 % (ref 11.5–14.5)
EST. GFR  (AFRICAN AMERICAN): >60 ML/MIN/1.73 M^2
EST. GFR  (NON AFRICAN AMERICAN): >60 ML/MIN/1.73 M^2
GLUCOSE SERPL-MCNC: 122 MG/DL (ref 70–110)
GRAM STN SPEC: NORMAL
HCT VFR BLD AUTO: 37.1 % (ref 40–54)
HGB BLD-MCNC: 14.1 G/DL (ref 14–18)
IMM GRANULOCYTES # BLD AUTO: ABNORMAL K/UL (ref 0–0.04)
IMM GRANULOCYTES NFR BLD AUTO: ABNORMAL % (ref 0–0.5)
LYMPHOCYTES NFR BLD: 7 % (ref 18–48)
MAGNESIUM SERPL-MCNC: 2.1 MG/DL (ref 1.6–2.6)
MCH RBC QN AUTO: 35.7 PG (ref 27–31)
MCHC RBC AUTO-ENTMCNC: 38 G/DL (ref 32–36)
MCV RBC AUTO: 94 FL (ref 82–98)
MONOCYTES NFR BLD: 3 % (ref 4–15)
NEUTROPHILS NFR BLD: 81 % (ref 38–73)
NEUTS BAND NFR BLD MANUAL: 9 %
NRBC BLD-RTO: 0 /100 WBC
PHOSPHATE SERPL-MCNC: 3.1 MG/DL (ref 2.7–4.5)
PLATELET # BLD AUTO: 290 K/UL (ref 150–350)
PMV BLD AUTO: 9.6 FL (ref 9.2–12.9)
POTASSIUM SERPL-SCNC: 3.2 MMOL/L (ref 3.5–5.1)
PROCALCITONIN SERPL IA-MCNC: 1.01 NG/ML (ref 0–0.5)
RBC # BLD AUTO: 3.95 M/UL (ref 4.6–6.2)
SARS-COV-2 RNA RESP QL NAA+PROBE: DETECTED
SODIUM SERPL-SCNC: 130 MMOL/L (ref 136–145)
WBC # BLD AUTO: 13.27 K/UL (ref 3.9–12.7)
WBC #/AREA STL HPF: NORMAL /[HPF]

## 2020-04-01 PROCEDURE — 63600175 PHARM REV CODE 636 W HCPCS: Performed by: INTERNAL MEDICINE

## 2020-04-01 PROCEDURE — 27000221 HC OXYGEN, UP TO 24 HOURS

## 2020-04-01 PROCEDURE — 63700000 PHARM REV CODE 250 ALT 637 W/O HCPCS: Performed by: INTERNAL MEDICINE

## 2020-04-01 PROCEDURE — 25000003 PHARM REV CODE 250: Performed by: EMERGENCY MEDICINE

## 2020-04-01 PROCEDURE — 94761 N-INVAS EAR/PLS OXIMETRY MLT: CPT

## 2020-04-01 PROCEDURE — 83735 ASSAY OF MAGNESIUM: CPT

## 2020-04-01 PROCEDURE — 25000003 PHARM REV CODE 250: Performed by: INTERNAL MEDICINE

## 2020-04-01 PROCEDURE — 63600175 PHARM REV CODE 636 W HCPCS

## 2020-04-01 PROCEDURE — 36415 COLL VENOUS BLD VENIPUNCTURE: CPT

## 2020-04-01 PROCEDURE — 85027 COMPLETE CBC AUTOMATED: CPT

## 2020-04-01 PROCEDURE — 21400001 HC TELEMETRY ROOM

## 2020-04-01 PROCEDURE — 87205 SMEAR GRAM STAIN: CPT

## 2020-04-01 PROCEDURE — 84100 ASSAY OF PHOSPHORUS: CPT

## 2020-04-01 PROCEDURE — 87070 CULTURE OTHR SPECIMN AEROBIC: CPT

## 2020-04-01 PROCEDURE — 80048 BASIC METABOLIC PNL TOTAL CA: CPT

## 2020-04-01 PROCEDURE — 85007 BL SMEAR W/DIFF WBC COUNT: CPT

## 2020-04-01 PROCEDURE — 93005 ELECTROCARDIOGRAM TRACING: CPT | Performed by: INTERNAL MEDICINE

## 2020-04-01 PROCEDURE — 25000003 PHARM REV CODE 250: Performed by: NURSE PRACTITIONER

## 2020-04-01 PROCEDURE — 63600175 PHARM REV CODE 636 W HCPCS: Performed by: NURSE PRACTITIONER

## 2020-04-01 PROCEDURE — 84145 PROCALCITONIN (PCT): CPT

## 2020-04-01 PROCEDURE — 63700000 PHARM REV CODE 250 ALT 637 W/O HCPCS: Performed by: NURSE PRACTITIONER

## 2020-04-01 PROCEDURE — 25000003 PHARM REV CODE 250

## 2020-04-01 RX ORDER — DIGOXIN 0.25 MG/ML
250 INJECTION INTRAMUSCULAR; INTRAVENOUS ONCE
Status: COMPLETED | OUTPATIENT
Start: 2020-04-02 | End: 2020-04-02

## 2020-04-01 RX ORDER — DIGOXIN 0.25 MG/ML
250 INJECTION INTRAMUSCULAR; INTRAVENOUS ONCE
Status: DISCONTINUED | OUTPATIENT
Start: 2020-04-01 | End: 2020-04-01

## 2020-04-01 RX ORDER — CHLORHEXIDINE GLUCONATE ORAL RINSE 1.2 MG/ML
15 SOLUTION DENTAL 2 TIMES DAILY
Status: DISCONTINUED | OUTPATIENT
Start: 2020-04-01 | End: 2020-04-05 | Stop reason: HOSPADM

## 2020-04-01 RX ORDER — DIGOXIN 0.25 MG/ML
INJECTION INTRAMUSCULAR; INTRAVENOUS
Status: DISPENSED
Start: 2020-04-01 | End: 2020-04-02

## 2020-04-01 RX ORDER — MUPIROCIN 20 MG/G
OINTMENT TOPICAL 2 TIMES DAILY
Status: DISCONTINUED | OUTPATIENT
Start: 2020-04-01 | End: 2020-04-05 | Stop reason: HOSPADM

## 2020-04-01 RX ORDER — ASPIRIN 81 MG/1
81 TABLET ORAL DAILY
Status: DISCONTINUED | OUTPATIENT
Start: 2020-04-01 | End: 2020-04-04

## 2020-04-01 RX ADMIN — DIGOXIN 250 MCG: 0.25 INJECTION INTRAMUSCULAR; INTRAVENOUS at 03:04

## 2020-04-01 RX ADMIN — ASPIRIN 81 MG: 81 TABLET, DELAYED RELEASE ORAL at 04:04

## 2020-04-01 RX ADMIN — HYDROXYCHLOROQUINE SULFATE 400 MG: 200 TABLET, FILM COATED ORAL at 09:04

## 2020-04-01 RX ADMIN — AZITHROMYCIN MONOHYDRATE 250 MG: 250 TABLET ORAL at 09:04

## 2020-04-01 RX ADMIN — CHLORHEXIDINE GLUCONATE 15 ML: 1.2 RINSE ORAL at 09:04

## 2020-04-01 RX ADMIN — MUPIROCIN: 20 OINTMENT TOPICAL at 09:04

## 2020-04-01 RX ADMIN — POTASSIUM CHLORIDE 40 MEQ: 20 SOLUTION ORAL at 06:04

## 2020-04-01 RX ADMIN — LEVOTHYROXINE SODIUM 100 MCG: 100 TABLET ORAL at 06:04

## 2020-04-01 RX ADMIN — NITAZOXANIDE 500 MG: 500 TABLET ORAL at 09:04

## 2020-04-01 RX ADMIN — POTASSIUM CHLORIDE 40 MEQ: 20 SOLUTION ORAL at 09:04

## 2020-04-01 RX ADMIN — ACETAMINOPHEN 650 MG: 325 TABLET ORAL at 09:04

## 2020-04-01 RX ADMIN — METOPROLOL SUCCINATE 12.5 MG: 25 TABLET, EXTENDED RELEASE ORAL at 09:04

## 2020-04-01 RX ADMIN — ZOLPIDEM TARTRATE 5 MG: 5 TABLET ORAL at 09:04

## 2020-04-01 RX ADMIN — CEFTRIAXONE 1 G: 1 INJECTION, SOLUTION INTRAVENOUS at 03:04

## 2020-04-01 RX ADMIN — ENOXAPARIN SODIUM 40 MG: 100 INJECTION SUBCUTANEOUS at 09:04

## 2020-04-01 NOTE — CONSULTS
[facility]  Cardiology  Consult Note    Patient Name: Rashawn Dai  MRN: [id number]  Admission Date: 3/31/2020  Hospital Length of Stay: 1 days  Code Status: Full Code   Attending Provider: Silas Carrasco MD   Consulting Provider: Avila Orlando MD  Primary Care Physician: Silver Rodriguez MD  Principal Problem:COVID-19 virus infection    Patient information was obtained from patient and ER records.     Consults  Subjective:     Chief Complaint:  Admitted to hospital with dyspnea and fever.     HPI:  64-year-old gentleman with approximately 7-8 a history of harm fever and cough presented to hospital with increasing shortness of breath and was found to have bilateral pneumonia and positive PCR test for COVID-19.  He was noted to be in atrial fibrillation.  Patient is not aware of any palpitations tachycardia.  He has he has shortness of breath but which seems not to have worsened in the hospital.  Of note other than some issues with thyroid he has never been diagnosed with atrial fibrillation, no history of hypertension diabetes or coronary artery disease.  Nonsmoker    Past Medical History:   Diagnosis Date    Thyroid disease        No past surgical history on file.    Review of patient's allergies indicates:   Allergen Reactions    Ibuprofen Hives       No current facility-administered medications on file prior to encounter.      Current Outpatient Medications on File Prior to Encounter   Medication Sig    azithromycin (ZITHROMAX Z-DEANGELO) 250 MG tablet Take 500 mg by mouth once daily.    benzonatate (TESSALON) 100 MG capsule Take 100 mg by mouth 3 (three) times daily as needed for Cough.    SYNTHROID 100 mcg tablet TAKE 1 TABLET EVERY DAY (Patient taking differently: Take 100 mcg by mouth before breakfast. )    sildenafil (VIAGRA) 100 MG tablet Take 1 tablet (100 mg total) by mouth daily as needed for Erectile Dysfunction.     Family History     Problem Relation (Age of Onset)    Cancer Father     Heart failure Mother        Tobacco Use    Smoking status: Never Smoker    Smokeless tobacco: Never Used   Substance and Sexual Activity    Alcohol use: Yes     Alcohol/week: 23.5 standard drinks     Types: 21 Glasses of wine, 3 Standard drinks or equivalent per week    Drug use: No    Sexual activity: Not on file     ROS  Objective:     Vital Signs (Most Recent):  Temp: 99.6 °F (37.6 °C) (04/01/20 1130)  Pulse: (!) 113 (04/01/20 1500)  Resp: (!) 26 (04/01/20 1500)  BP: 108/75 (04/01/20 1500)  SpO2: 96 % (04/01/20 1500) Vital Signs (24h Range):  Temp:  [99.4 °F (37.4 °C)-100.8 °F (38.2 °C)] 99.6 °F (37.6 °C)  Pulse:  [] 113  Resp:  [17-44] 26  SpO2:  [90 %-97 %] 96 %  BP: (102-140)/() 108/75     Weight: 93.9 kg (207 lb)  Body mass index is 30.57 kg/m².    SpO2: 96 %  O2 Device (Oxygen Therapy): nasal cannula      Intake/Output Summary (Last 24 hours) at 4/1/2020 1601  Last data filed at 4/1/2020 1500  Gross per 24 hour   Intake 1370 ml   Output 120 ml   Net 1250 ml       Lines/Drains/Airways     Peripheral Intravenous Line                 Peripheral IV - Single Lumen 03/31/20 1118 20 G Left Antecubital 1 day                Physical Exam he looks anxious  He is on 5 L of oxygen with pulse oxygen saturation of 97%.  Cardiac exam reveals normal volume pulses 110 beats per minute  Heart sounds are normal I did not hear any murmur or gallops.  A chest x-ray shows bilateral infiltrates consistent with viral pneumonia I do not see any Kerley B lines or effusions suggests congestive heart failure    Significant Labs:   CBC   Recent Labs   Lab 03/31/20  1125 04/01/20  0409   WBC 11.65 13.27*   HGB 15.7 14.1   HCT 43.5 37.1*    290    and Troponin   Recent Labs   Lab 03/31/20  1125   TROPONINI <0.030       Significant Imaging: EKG: ECG shows low-voltage complexes with atrial fibrillation no ST-T changes of ischemia  Assessment and Plan:  New onset atrial fibrillation secondary to respiratory  illness.  There is no clinical or biochemical evidence of myocarditis from COVID-19  Plan  His JUAN ALBERTO-VASC score is 0  He is receiving metoprolol 12.5 mg b.i.d..  I have prescribed aspirin 81 mg daily  Digoxin 0.25 mg time 2 doses 12 hr apart  There is no need for antiarrhythmic drugs at this point especially as he is on hydroxychloroquine.  Will request a BNP and troponin levels tomorrow if these are significantly abnormal consider echocardiogram.  Thank this consultation     Active Diagnoses:    Diagnosis Date Noted POA    PRINCIPAL PROBLEM:  COVID-19 virus infection [U07.1] 03/31/2020 Yes    New onset Atrial fibrillation with RVR [I48.91] 04/01/2020 Yes    Hypokalemia [E87.6] 04/01/2020 Yes    Hyponatremia [E87.1] 04/01/2020 Yes    Transaminitis [R74.0] 04/01/2020 Yes    Diarrhea [R19.7] 04/01/2020 Yes    Pneumonia due to COVID-19 virus [U07.1, J12.89] 04/01/2020 Yes    Atypical pneumonia [J18.9] 03/31/2020 Yes    Shortness of breath [R06.02] 03/31/2020 Yes      Problems Resolved During this Admission:       VTE Risk Mitigation (From admission, onward)         Ordered     enoxaparin injection 40 mg  Daily      03/31/20 1506     IP VTE HIGH RISK PATIENT  Once      03/31/20 1506                Thank you for your consult. I will follow-up with patient. Please contact us if you have any additional questions.    Avila Orlando MD  Cardiology   [facility]

## 2020-04-01 NOTE — PLAN OF CARE
Cm spoke with wife Trish. DC plan is to be determined. CM to follow until dc from hospital.     04/01/20 1043   Discharge Assessment   Assessment Type Discharge Planning Assessment   Confirmed/corrected address and phone number on facesheet? Yes   Assessment information obtained from? Other  (Trish wife)   Communicated expected length of stay with patient/caregiver no   Prior to hospitilization cognitive status: Alert/Oriented   Prior to hospitalization functional status: Independent   Current cognitive status: Unable to Assess   Current Functional Status: Partially Dependent   Lives With spouse   Able to Return to Prior Arrangements yes   Is patient able to care for self after discharge? Unable to determine at this time (comments)   Who are your caregiver(s) and their phone number(s)? Trish Dai  [phone]   Patient's perception of discharge disposition home or selfcare   Readmission Within the Last 30 Days no previous admission in last 30 days   Patient currently being followed by outpatient case management? No   Patient currently receives any other outside agency services? No   Equipment Currently Used at Home none   Part D Coverage BCBS   Do you have any problems affording any of your prescribed medications? No   Is the patient taking medications as prescribed? yes   Does the patient have transportation home? Yes   Transportation Anticipated family or friend will provide   Does the patient receive services at the [facility]mad[facility] Clinic? No   Discharge Plan A Home with family   Discharge Plan B Home Health   DME Needed Upon Discharge  other (see comments)  (PT/OT to assess)   Patient/Family in Agreement with Plan yes

## 2020-04-01 NOTE — RESPIRATORY THERAPY
This note also relates to the following rows which could not be included:  SpO2 - Cannot attach notes to unvalidated device data  Pulse - Cannot attach notes to unvalidated device data  Resp - Cannot attach notes to unvalidated device data       04/01/20 0129   Patient Assessment/Suction   Level of Consciousness (AVPU) alert   All Lung Fields Breath Sounds diminished   PRE-TX-O2   O2 Device (Oxygen Therapy) nasal cannula   $ Is the patient on Low Flow Oxygen? Yes   Flow (L/min) 3   Oxygen Concentration (%) 32   Pulse Oximetry Type Continuous   $ Pulse Oximetry - Multiple Charge Pulse Oximetry - Multiple   Oximetry Probe Site Assessed;Intact   Ready to Wean/Extubation Screen   FIO2<=50 (chart decimal) 0.32

## 2020-04-01 NOTE — PROGRESS NOTES
[facility] Medicine    Progress Note    Patient Name: Rashawn Dai  MRN: [id number]  Patient Class: IP- Inpatient   Admission Date: 3/31/2020 10:45 AM  Length of Stay: 1  Attending Physician: SHARITA MURPHY MD  Primary Care Provider: Silver Rodriguez MD  Face-to-Face encounter date: 04/01/2020  Chief Complaint: Headache (X 1 WEEK, POS SCREEN); Diarrhea; Shortness of Breath (TODAY); and Fever         Patient ID: Rashawn Dai is a 64 y.o. male admitted for   Active Hospital Problems    Diagnosis  POA    *Atypical pneumonia [J18.9]  Yes    Shortness of breath [R06.02]  Unknown    Suspected Covid-19 Virus Infection [R68.89]  Unknown    Sinus tachycardia [R00.0]  Unknown      Resolved Hospital Problems   No resolved problems to display.      HPI by Shelia Almanzar NP on 03/31/2020: History was obtained from the patient and ER physician Sign-out. Patient presented with headache, cough, shortness of breath and fever x 9 days. His symptoms are progressively getting worst. Other associated symptoms include diarrhea and fatigue. Denies any alleviating or worsening factors. He is a  and reports he has come into contact with coworkers who tested positive for COVID 19.  Patient was seen at outside clinic a few days ago and treated with azithromycin with no improvement. Patient decided to come to the ER for further evaluation today after becoming SOB.      In the emergency room, patient was saturating in low 90s on room air. He required 2L oxygen.  He is febrile. Patient CBC was unremarkable. CMP was unremarkable. CRP is pending. BNP and troponin was negative. Reviewed EKG which shows tachycardia 110s. HR progressed to 130s while in the ED. No QTC prolongation. He is given 500cc NS. CXR shows bilateral infiltratres consistent with COVID 19.      Decision to admit was taken and patient was informed about the plan of care.     Subjective:    Interval History: Patient is doing well. Family  present at bedside.No concerns/issues overnight reported by the patient or the nursing staff.    Review of Systems All other Review of Systems were found to be negative expect for that mentioned already in HPI.     Objective:     Physical Exam  Vitals:    04/01/20 0701   BP: 121/68   Pulse: (!) 113   Resp: (!) 35   Temp: 99.4 °F (37.4 °C)     Wt Readings from Last 1 Encounters:   03/31/20 93.9 kg (207 lb)      Body mass index is 30.57 kg/m².    Vitals reviewed.  Constitutional: No distress.   HENT:   Head: Atraumatic.   Cardiovascular: IRR  Pulmonary/Chest: Effort normal. No wheezes.   Abdominal: Soft. Bowel sounds are normal. No distension and no mass. No tenderness  Neurological: Alert.   Skin: Skin is warm and dry.     Following labs were Reviewed   CBC:  Recent Labs   Lab 04/01/20  0409   WBC 13.27*   HGB 14.1   HCT 37.1*        CMP:  Recent Labs   Lab 03/31/20 1125 04/01/20  0409   CALCIUM 8.7 8.1*   ALBUMIN 3.4*  --    PROT 7.9  --    * 130*   K 3.3* 3.2*   CO2 22* 16*   CL 94* 100   BUN 19 15   CREATININE 1.2 1.0   ALKPHOS 67  --    ALT 65*  --    AST 83*  --    BILITOT 1.0  --      Last 72 hour POCT GLUCOSE  No results found for: POCTGLUCOSE     Microbiology Results (last 7 days)     Procedure Component Value Units Date/Time    Culture, Respiratory [[id number]] Collected:  04/01/20 0736    Order Status:  Sent Specimen:  Respiratory from Sputum Updated:  04/01/20 0746    Stool culture [[id number]] Collected:  03/31/20 1935    Order Status:  Sent Specimen:  Stool Updated:  03/31/20 2340    Blood culture [[id number]] Collected:  03/31/20 1125    Order Status:  Completed Specimen:  Blood from Peripheral, Antecubital, Left Updated:  03/31/20 1917     Blood Culture, Routine No Growth to date    Blood culture [[id number]] Collected:  03/31/20 1128    Order Status:  Completed Specimen:  Blood from Peripheral, Antecubital, Left Updated:  03/31/20 1917     Blood Culture, Routine No Growth to date             X-Ray Chest AP Portable   Final Result      Right greater than left, mixed interstitial and alveolar opacities at the lung bases suggesting multifocal pneumonia (and less likely edema, ARDS or aspiration).  Consider radiographic follow-up to document resolution.      RESULT NOTIFICATION: These observations were discussed by the dictating physician, by phone with, and acknowledged by Javy Talavera at 11:09 on 3/31/2020.         Electronically signed by: Jax Cook MD   Date:    03/31/2020   Time:    11:12            Scheduled Meds:   azithromycin  250 mg Oral Daily    cefTRIAXone (ROCEPHIN) IVPB  1 g Intravenous Q24H    enoxaparin  40 mg Subcutaneous Daily    hydroxychloroquine  400 mg Oral Daily    levothyroxine  100 mcg Oral Before breakfast    nitazoxanide  500 mg Oral Q12H    potassium chloride 10%  40 mEq Oral Once     Continuous Infusions:  PRN Meds:.acetaminophen, magnesium oxide, magnesium oxide, ondansetron, potassium chloride 10%, potassium chloride 10%, potassium chloride 10%, potassium, sodium phosphates, potassium, sodium phosphates, potassium, sodium phosphates, sodium chloride 0.9%, zolpidem    04/01/2020:     Assessment & Plan:     * SARS CoV 2 Pneumonia; Shortness of Breath  Covid PCR detected    Admit to ICU  Consulted ID, appreciate the recs  Cont IV Azithromycin/Rocephin as Procal elevated as well (?superimposed bacterial infection?)  [id number] mg daily x 4 days, if symptoms resolving slow, can extend to 7 days   Supplemental Oxygen, currently at 4 lpm and sating at 96%  Resp cx sent this am- follow cx   Lovenox for VTE prophylaxis  Ferritin 3143 on admission  CPR 25.08 on admission       Afib with RVR- New onset  Likely secondary to dehydration or fever  Repeat EKG now to clarify ST vs Afib with RVR  500cc bolus in the ED; additional 1L bolus again yesterday  Continuous cardiac monitoring  Received metoprolol 5 mg IV in the ER  Will start with  metoprolol succinate 12.5 mg daily, monitor BP   If heart rate cont to go up, will consult Cards, Pt does not have a primary cardiologist    Sepsis  Possibly secondary to Covid-19 infection  Treatment as above    Hyponatremia  Monitor   Compared to 6/2018 labs, Na was normal    Electrolyte Imbalance  K 3.2  Replace per protocol     Tranaminitis   Due to sepsis?  Monitor    Diarrhea  Noted Dr Montanez ordered stolll studies- results pending  Pt also started on Alinia BID x 3 days   ? C Diff        Discharge Planning:   In ICU, in isolation    Above encounter included review of the medical records, interviewing and examining the patient face-to-face, discussion with family and other health care providers, ordering and interpreting lab/test results and formulating a plan of care.     Medical Decision Making:      [_] Low Complexity  [_] Moderate Complexity  [x] High Complexity      Silas Carrasco MD  Department of Hospital Medicine   [facility]

## 2020-04-01 NOTE — NURSING TRANSFER
Nursing Transfer Note      4/1/2020     Transfer To: 2521    Transfer via wheelchair    Transfer with to O2    Transported by staff    Medicines sent: y    Chart send with patient: Yes    Notified: spouse    Patient reassessed at: 04/01/2020     Upon arrival to floor: cardiac monitor applied  Report called, patient transferring to room 2521, in wc with o2, and heart monitor, family aware of transfer

## 2020-04-01 NOTE — NURSING
Am round, patient awake and alert, vitals stable, states that he has less SOB, low grade temps continue, n/c o2 in use, ambulates to bathroom, isolation maintained., pupils equal and reactive.

## 2020-04-02 LAB
ANION GAP SERPL CALC-SCNC: 14 MMOL/L (ref 8–16)
BASOPHILS # BLD AUTO: 0.06 K/UL (ref 0–0.2)
BASOPHILS NFR BLD: 0.4 % (ref 0–1.9)
BNP SERPL-MCNC: 234 PG/ML (ref 0–99)
BUN SERPL-MCNC: 23 MG/DL (ref 8–23)
CALCIUM SERPL-MCNC: 8.7 MG/DL (ref 8.7–10.5)
CHLORIDE SERPL-SCNC: 103 MMOL/L (ref 95–110)
CO2 SERPL-SCNC: 17 MMOL/L (ref 23–29)
CREAT SERPL-MCNC: 1 MG/DL (ref 0.5–1.4)
DIFFERENTIAL METHOD: ABNORMAL
EOSINOPHIL # BLD AUTO: 0.1 K/UL (ref 0–0.5)
EOSINOPHIL NFR BLD: 0.5 % (ref 0–8)
ERYTHROCYTE [DISTWIDTH] IN BLOOD BY AUTOMATED COUNT: 12 % (ref 11.5–14.5)
EST. GFR  (AFRICAN AMERICAN): >60 ML/MIN/1.73 M^2
EST. GFR  (NON AFRICAN AMERICAN): >60 ML/MIN/1.73 M^2
GLUCOSE SERPL-MCNC: 114 MG/DL (ref 70–110)
HCT VFR BLD AUTO: 43.2 % (ref 40–54)
HGB BLD-MCNC: 15.4 G/DL (ref 14–18)
IMM GRANULOCYTES # BLD AUTO: 0.18 K/UL (ref 0–0.04)
IMM GRANULOCYTES NFR BLD AUTO: 1.3 % (ref 0–0.5)
LYMPHOCYTES # BLD AUTO: 1.4 K/UL (ref 1–4.8)
LYMPHOCYTES NFR BLD: 10.2 % (ref 18–48)
MAGNESIUM SERPL-MCNC: 2.3 MG/DL (ref 1.6–2.6)
MCH RBC QN AUTO: 33.1 PG (ref 27–31)
MCHC RBC AUTO-ENTMCNC: 35.6 G/DL (ref 32–36)
MCV RBC AUTO: 93 FL (ref 82–98)
MONOCYTES # BLD AUTO: 0.9 K/UL (ref 0.3–1)
MONOCYTES NFR BLD: 6.1 % (ref 4–15)
NEUTROPHILS # BLD AUTO: 11.4 K/UL (ref 1.8–7.7)
NEUTROPHILS NFR BLD: 81.5 % (ref 38–73)
NRBC BLD-RTO: 0 /100 WBC
O+P STL TRI STN: NORMAL
PHOSPHATE SERPL-MCNC: 4.1 MG/DL (ref 2.7–4.5)
PLATELET # BLD AUTO: 349 K/UL (ref 150–350)
PMV BLD AUTO: 9.6 FL (ref 9.2–12.9)
POTASSIUM SERPL-SCNC: 3.7 MMOL/L (ref 3.5–5.1)
PROCALCITONIN SERPL IA-MCNC: 0.59 NG/ML (ref 0–0.5)
RBC # BLD AUTO: 4.65 M/UL (ref 4.6–6.2)
SODIUM SERPL-SCNC: 134 MMOL/L (ref 136–145)
TROPONIN I SERPL DL<=0.01 NG/ML-MCNC: <0.03 NG/ML
WBC # BLD AUTO: 14.03 K/UL (ref 3.9–12.7)

## 2020-04-02 PROCEDURE — 25000003 PHARM REV CODE 250: Performed by: INTERNAL MEDICINE

## 2020-04-02 PROCEDURE — 63600175 PHARM REV CODE 636 W HCPCS: Performed by: INTERNAL MEDICINE

## 2020-04-02 PROCEDURE — 84484 ASSAY OF TROPONIN QUANT: CPT

## 2020-04-02 PROCEDURE — 83880 ASSAY OF NATRIURETIC PEPTIDE: CPT

## 2020-04-02 PROCEDURE — 83735 ASSAY OF MAGNESIUM: CPT

## 2020-04-02 PROCEDURE — 21400001 HC TELEMETRY ROOM

## 2020-04-02 PROCEDURE — 93005 ELECTROCARDIOGRAM TRACING: CPT | Performed by: INTERNAL MEDICINE

## 2020-04-02 PROCEDURE — 85025 COMPLETE CBC W/AUTO DIFF WBC: CPT

## 2020-04-02 PROCEDURE — 80048 BASIC METABOLIC PNL TOTAL CA: CPT

## 2020-04-02 PROCEDURE — 84145 PROCALCITONIN (PCT): CPT

## 2020-04-02 PROCEDURE — 63700000 PHARM REV CODE 250 ALT 637 W/O HCPCS: Performed by: INTERNAL MEDICINE

## 2020-04-02 PROCEDURE — 36415 COLL VENOUS BLD VENIPUNCTURE: CPT

## 2020-04-02 PROCEDURE — 84100 ASSAY OF PHOSPHORUS: CPT

## 2020-04-02 RX ADMIN — AZITHROMYCIN MONOHYDRATE 250 MG: 250 TABLET ORAL at 08:04

## 2020-04-02 RX ADMIN — LEVOTHYROXINE SODIUM 100 MCG: 100 TABLET ORAL at 06:04

## 2020-04-02 RX ADMIN — MUPIROCIN: 20 OINTMENT TOPICAL at 08:04

## 2020-04-02 RX ADMIN — ZOLPIDEM TARTRATE 5 MG: 5 TABLET ORAL at 08:04

## 2020-04-02 RX ADMIN — ASPIRIN 81 MG: 81 TABLET, DELAYED RELEASE ORAL at 08:04

## 2020-04-02 RX ADMIN — CEFTRIAXONE 1 G: 1 INJECTION, SOLUTION INTRAVENOUS at 05:04

## 2020-04-02 RX ADMIN — DIGOXIN 250 MCG: 0.25 INJECTION INTRAMUSCULAR; INTRAVENOUS at 03:04

## 2020-04-02 RX ADMIN — METOPROLOL SUCCINATE 12.5 MG: 25 TABLET, EXTENDED RELEASE ORAL at 08:04

## 2020-04-02 RX ADMIN — ENOXAPARIN SODIUM 40 MG: 100 INJECTION SUBCUTANEOUS at 08:04

## 2020-04-02 RX ADMIN — NITAZOXANIDE 500 MG: 500 TABLET ORAL at 08:04

## 2020-04-02 RX ADMIN — HYDROXYCHLOROQUINE SULFATE 400 MG: 200 TABLET, FILM COATED ORAL at 08:04

## 2020-04-02 RX ADMIN — NITAZOXANIDE 500 MG: 500 TABLET ORAL at 11:04

## 2020-04-02 RX ADMIN — POTASSIUM CHLORIDE 40 MEQ: 20 SOLUTION ORAL at 06:04

## 2020-04-02 NOTE — PLAN OF CARE
Problem: Oral Intake Inadequate  Goal: Improved Oral Intake  Outcome: Ongoing, Progressing  Intervention: Promote and Optimize Oral Intake  Flowsheets (Taken 4/2/2020 1330)  Oral Nutrition Promotion: calorie dense liquids provided   Added Ensure Enlive TID to aid in meeting estimated needs.

## 2020-04-02 NOTE — PROGRESS NOTES
[facility] Medicine    Progress Note    Patient Name: Rashawn Dai  MRN: [id number]  Patient Class: IP- Inpatient   Admission Date: 3/31/2020 10:45 AM  Length of Stay: 2  Attending Physician: SHARITA MURPHY MD  Primary Care Provider: Silver Rodriguez MD  Face-to-Face encounter date: 04/02/2020  Chief Complaint: Headache (X 1 WEEK, POS SCREEN); Diarrhea; Shortness of Breath (TODAY); and Fever         Patient ID: Rashawn Dai is a 64 y.o. male admitted for   Active Hospital Problems    Diagnosis  POA    *COVID-19 virus infection [U07.1]  Yes    New onset Atrial fibrillation with RVR [I48.91]  Yes    Hypokalemia [E87.6]  Yes    Hyponatremia [E87.1]  Yes    Transaminitis [R74.0]  Yes    Diarrhea [R19.7]  Yes    Pneumonia due to COVID-19 virus [U07.1, J12.89]  Yes    Atypical pneumonia [J18.9]  Yes    Shortness of breath [R06.02]  Yes      Resolved Hospital Problems   No resolved problems to display.      HPI by Shelia Almanzar NP on 03/31/2020: History was obtained from the patient and ER physician Sign-out. Patient presented with headache, cough, shortness of breath and fever x 9 days. His symptoms are progressively getting worst. Other associated symptoms include diarrhea and fatigue. Denies any alleviating or worsening factors. He is a  and reports he has come into contact with coworkers who tested positive for COVID 19.  Patient was seen at outside clinic a few days ago and treated with azithromycin with no improvement. Patient decided to come to the ER for further evaluation today after becoming SOB.      In the emergency room, patient was saturating in low 90s on room air. He required 2L oxygen.  He is febrile. Patient CBC was unremarkable. CMP was unremarkable. CRP is pending. BNP and troponin was negative. Reviewed EKG which shows tachycardia 110s. HR progressed to 130s while in the ED. No QTC prolongation. He is given 500cc NS. CXR shows bilateral infiltratres  consistent with COVID 19.      Decision to admit was taken and patient was informed about the plan of care.     Subjective:    Interval History: Patient is doing well as compared but still easily sob on mild exertion.   No Family present at bedside.  No concerns/issues overnight reported by the patient or the nursing staff.    Review of Systems All other Review of Systems were found to be negative expect for that mentioned already in HPI.     Objective:     Physical Exam  Vitals:    04/02/20 1145   BP: 119/83   Pulse: 107   Resp: 19   Temp: 97.5 °F (36.4 °C)     Wt Readings from Last 1 Encounters:   04/01/20 93.7 kg (206 lb 9.1 oz)      Body mass index is 30.51 kg/m².    Vitals reviewed.  Constitutional: No distress.   HENT: NC  Head: Atraumatic.   Cardiovascular: Irregularly irregular  Pulmonary/Chest: Effort normal. No wheezes.   Abdominal: Soft. Bowel sounds are normal. No distension and no mass. No tenderness  Neurological: Alert.   Skin: Skin is warm and dry.     Following labs were Reviewed   CBC:  Recent Labs   Lab 04/02/20  0502   WBC 14.03*   HGB 15.4   HCT 43.2        CMP:  Recent Labs   Lab 04/02/20  0502   CALCIUM 8.7   *   K 3.7   CO2 17*      BUN 23   CREATININE 1.0     Last 72 hour POCT GLUCOSE  No results found for: POCTGLUCOSE     Microbiology Results (last 7 days)     Procedure Component Value Units Date/Time    Blood culture [[id number]] Collected:  03/31/20 1125    Order Status:  Completed Specimen:  Blood from Peripheral, Antecubital, Left Updated:  04/02/20 1232     Blood Culture, Routine No Growth to date      No Growth to date      No Growth to date    Blood culture [[id number]] Collected:  03/31/20 1128    Order Status:  Completed Specimen:  Blood from Peripheral, Antecubital, Left Updated:  04/02/20 1232     Blood Culture, Routine No Growth to date      No Growth to date      No Growth to date    Stool culture [[id number]] Collected:  03/31/20 1935    Order Status:   Completed Specimen:  Stool Updated:  04/02/20 0950     Stool Culture No growth    Clostridium difficile EIA [[id number]] Collected:  03/31/20 2032    Order Status:  Completed Specimen:  Stool Updated:  04/01/20 1147     C. diff Antigen Negative     C difficile Toxins A+B, EIA Negative     Comment: Testing not recommended for children <24 months old.       Narrative:       ADD ON IF POSSIBLE    Culture, Respiratory [[id number]] Collected:  04/01/20 0736    Order Status:  Completed Specimen:  Respiratory from Sputum Updated:  04/01/20 0916     Respiratory Culture Specimen inadequate - culture not performed     Gram Stain (Respiratory) >10epis/lfp and <than many WBC's     Gram Stain (Respiratory) Moderate Gram positive cocci     Gram Stain (Respiratory) Moderate Gram positive rods     Gram Stain (Respiratory) Moderate Gram negative rods     Gram Stain (Respiratory) Predominance of oropharyngeal collin. Please recollect.     Gram Stain (Respiratory) Results called to and read back by:Mellisa Jon RN 2BICU  04/01/2020       Gram Stain (Respiratory) 09:15 JBM            X-Ray Chest AP Portable   Final Result      Interstitial and ground-glass opacities bilaterally, typical of COVID pneumonia, minimally worsened compared to prior.         Electronically signed by: Trey Rivas MD   Date:    04/02/2020   Time:    11:29      X-Ray Chest AP Portable   Final Result      Right greater than left, mixed interstitial and alveolar opacities at the lung bases suggesting multifocal pneumonia (and less likely edema, ARDS or aspiration).  Consider radiographic follow-up to document resolution.      RESULT NOTIFICATION: These observations were discussed by the dictating physician, by phone with, and acknowledged by Javy Talavera at 11:09 on 3/31/2020.         Electronically signed by: Jax Cook MD   Date:    03/31/2020   Time:    11:12            Scheduled Meds:   aspirin  81 mg Oral Daily    azithromycin  250 mg Oral Daily     cefTRIAXone (ROCEPHIN) IVPB  1 g Intravenous Q24H    chlorhexidine  15 mL Mouth/Throat BID    enoxaparin  40 mg Subcutaneous Daily    hydroxychloroquine  400 mg Oral Daily    levothyroxine  100 mcg Oral Before breakfast    metoprolol succinate  12.5 mg Oral Daily    mupirocin   Nasal BID    nitazoxanide  500 mg Oral Q12H     Continuous Infusions:  PRN Meds:.acetaminophen, magnesium oxide, magnesium oxide, ondansetron, potassium chloride 10%, potassium chloride 10%, potassium chloride 10%, potassium, sodium phosphates, potassium, sodium phosphates, potassium, sodium phosphates, sodium chloride 0.9%, zolpidem    04/01/2020:   04/02/2020: QTc 371    Assessment & Plan:     * SARS CoV 2 Pneumonia; Shortness of Breath  Covid PCR detected    Admit to ICU  Consulted ID, appreciate the recs  Cont IV Azithromycin/Rocephin as Procal elevated as well (?superimposed bacterial infection?)  [id number] mg daily x 4 days, if symptoms resolving slow, can extend to 7 days   Supplemental Oxygen, currently at 0.5 LPM with ox sat at 98%  Resp cx sent this am- follow cx   Lovenox for VTE prophylaxis  Ferritin 3143 on admission  CPR 25.08 on admission    Procal 0.59 today    Afib with RVR- New onset  Likely secondary to dehydration or fever  Repeat EKG now to clarify ST vs Afib with RVR  CHADS VASC score is 0  Continuous cardiac monitoring  Continue metoprolol succinate 12.5 mg daily, monitor BP   Digoxin 0.25 mg IV x1 received today per Dr. Darion Orlando on board.  Appreciate the input  BNP slightly elevated as well    Sepsis  Possibly secondary to Covid-19 infection  Treatment as above    Hyponatremia  Monitor   Compared to 6/2018 labs, Na was normal    Electrolyte Imbalance  K 3.7  Replace per protocol     Tranaminitis   Due to sepsis?  Monitor    Diarrhea  C Diff negative  Possible Covid Diarrhea   Pt also started on Alinia BID x 3 days on admission        Discharge Planning:   Not yet  planned, will re eval tomorrow     Above encounter included review of the medical records, interviewing and examining the patient face-to-face, discussion with family and other health care providers, ordering and interpreting lab/test results and formulating a plan of care.     Medical Decision Making:      [_] Low Complexity  [_] Moderate Complexity  [x] High Complexity      Silas Carrasco MD  Department of Hospital Medicine   [facility]

## 2020-04-02 NOTE — PROGRESS NOTES
On today's ECG QTC and QT are within normal limits the computer is over reading QTC to be prolonged.  ECG shows atrial fibrillation no significant ST-T changes of ischemia or infarction

## 2020-04-02 NOTE — PROGRESS NOTES
"[facility]  Adult Nutrition   Progress Note (Initial Assessment)     SUMMARY     Recommendations/Interventions:    Recommendation/Intervention: 1. Continue current diet as tolerated, encourage intake. 2. Added Ensure Enlive TID to aid in meeting estimated needs.  Goals: 1. Patient to meet at least 75% of estimated needs via PO intake of meals and supplements.  Nutrition Goal Status: new    Dietitian Rounds Brief:  · Seen 2' LOS. Patient transferred out of ICU yesterday. COVID +. Appetite poor. Added ONS. Diarrhea and SOB. Will continue to monitor.  Reason for Assessment  Reason For Assessment: identified at risk by screening criteria  Diagnosis: (COVID -19)  Relevant Medical History: thyroid disease  Interdisciplinary Rounds: did not attend    Nutrition Risk Screen  Nutrition Risk Screen: no indicators present     MST Score: 3  Have you recently lost weight without trying?: Unsure  Weight loss score: 2  Have you been eating poorly because of a decreased appetite?: Yes  Appetite score: 1     Nutrition/Diet History  Patient Reported Diet/Restrictions/Preferences: general  Spiritual, Cultural Beliefs, Uatsdin Practices, Values that Affect Care: yes  Food Allergies: NKFA  Factors Affecting Nutritional Intake: decreased appetite    Anthropometrics  Temp: 97.5 °F (36.4 °C)  Height Method: Stated  Height: 5' 9" (175.3 cm)  Height (inches): 69 in  Weight Method: Bed Scale  Weight: 93.7 kg (206 lb 9.1 oz)  Weight (lb): 206.57 lb  Ideal Body Weight (IBW), Male: 160 lb  % Ideal Body Weight, Male (lb): 129.11 %  BMI (Calculated): 30.5  BMI Grade: 30 - 34.9- obesity - grade I     Weight History:  Wt Readings from Last 10 Encounters:   04/01/20 93.7 kg (206 lb 9.1 oz)   06/05/18 93.9 kg (207 lb)   09/29/16 92.5 kg (204 lb)   09/14/16 93.1 kg (205 lb 4 oz)   09/22/15 95.3 kg (210 lb)   03/14/14 93.4 kg (206 lb)     Lab/Procedures/Meds: Pertinent Labs Reviewed  Clinical Chemistry:  Recent Labs   Lab 03/31/20  1125 " 04/01/20  0409 04/02/20  0502   * 130* 134*   K 3.3* 3.2* 3.7   CL 94* 100 103   CO2 22* 16* 17*   * 122* 114*   BUN 19 15 23   CREATININE 1.2 1.0 1.0   CALCIUM 8.7 8.1* 8.7   PROT 7.9  --   --    ALBUMIN 3.4*  --   --    BILITOT 1.0  --   --    ALKPHOS 67  --   --    AST 83*  --   --    ALT 65*  --   --    ANIONGAP 15 14 14   ESTGFRAFRICA >60.0 >60.0 >60.0   EGFRNONAA >60.0 >60.0 >60.0   MG 2.0 2.1 2.3   PHOS  --  3.1 4.1     CBC:   Recent Labs   Lab 04/02/20  0502   WBC 14.03*   RBC 4.65   HGB 15.4   HCT 43.2      MCV 93   MCH 33.1*   MCHC 35.6     Cardiac Profile:  Recent Labs   Lab 03/31/20  1125 04/02/20  0502   BNP  --  234*   TROPONINI <0.030 <0.030     Inflammatory Labs:  Recent Labs   Lab 03/31/20  1125   CRP 25.08*     Medications: Pertinent Medications reviewed  Scheduled Meds:   aspirin  81 mg Oral Daily    azithromycin  250 mg Oral Daily    cefTRIAXone (ROCEPHIN) IVPB  1 g Intravenous Q24H    chlorhexidine  15 mL Mouth/Throat BID    enoxaparin  40 mg Subcutaneous Daily    hydroxychloroquine  400 mg Oral Daily    levothyroxine  100 mcg Oral Before breakfast    metoprolol succinate  12.5 mg Oral Daily    mupirocin   Nasal BID    nitazoxanide  500 mg Oral Q12H     Continuous Infusions:  PRN Meds:.acetaminophen, magnesium oxide, magnesium oxide, ondansetron, potassium chloride 10%, potassium chloride 10%, potassium chloride 10%, potassium, sodium phosphates, potassium, sodium phosphates, potassium, sodium phosphates, sodium chloride 0.9%, zolpidem    Estimated/Assessed Needs    Weight Used For Calorie Calculations: 93.7 kg (206 lb 9.1 oz)  Energy Calorie Requirements (kcal): 4994-1211 kcals/day (20-25 kcals/kg)  Energy Need Method: Kcal/kg  Protein Requirements: 109-130 g/day (1.2-1.8 g/kg IBW)  Weight Used For Protein Calculations: 72.6 kg (160 lb)     Estimated Fluid Requirement Method: RDA Method    Nutrition Prescription Ordered    Current Diet Order: Dysphagia Diet      Evaluation of Received Nutrient/Fluid Intake    Energy Calories Required: not meeting needs  Protein Required: not meeting needs  Fluid Required: meeting needs  Tolerance: tolerating  % Intake of Estimated Energy Needs: 0 - 25 %  % Meal Intake: 0 - 25 %    Intake/Output Summary (Last 24 hours) at 4/2/2020 1327  Last data filed at 4/1/2020 1600  Gross per 24 hour   Intake 450 ml   Output --   Net 450 ml      Nutrition Risk    Level of Risk/Frequency of Follow-up: high   Monitor and Evaluation    Food and Nutrient Intake: energy intake, food and beverage intake  Food and Nutrient Adminstration: diet order  Physical Activity and Function: nutrition-related ADLs and IADLs, factors affecting access to physical activity  Anthropometric Measurements: weight, weight change  Biochemical Data, Medical Tests and Procedures: electrolyte and renal panel, lipid profile, gastrointestinal profile, glucose/endocrine profile, inflammatory profile  Nutrition-Focused Physical Findings: overall appearance     Nutrition Follow-Up    RD Follow-up?: Yes  Kelsi Castaneda RD 04/02/2020 1:30 PM

## 2020-04-02 NOTE — PROGRESS NOTES
[facility]  Cardiology  Progress Note    Patient Name: Rashawn Dai  MRN: [id number]  Admission Date: 3/31/2020  Hospital Length of Stay: 2 days  Code Status: Full Code   Attending Physician: Silas Carrasco MD   Primary Care Physician: Silver Rodriguez MD  Expected Discharge Date:   Principal Problem:COVID-19 virus infection    Subjective:     Hospital Course:  Admitted to hospital on 03/31/2020 is with COVID-19.  04/01/2020 moved out of ICU on to cardiology B floor      Interval History:  Today he feels about the same he has lot of fatigue and shortness of breath on minimal exertion like cup sitting up.  His appetite is poor.  Still having diarrhea.  Denies dyspnea at rest.  Not aware of tachycardia denies palpitations.    ROS  Objective:     Vital Signs (Most Recent):  Temp: 97.7 °F (36.5 °C) (04/02/20 0715)  Pulse: 69 (04/02/20 0715)  Resp: 19 (04/02/20 0715)  BP: 122/78 (04/02/20 0715)  SpO2: 97 % (04/02/20 0715) Vital Signs (24h Range):  Temp:  [97.5 °F (36.4 °C)-99.6 °F (37.6 °C)] 97.7 °F (36.5 °C)  Pulse:  [] 69  Resp:  [18-30] 19  SpO2:  [94 %-99 %] 97 %  BP: (108-132)/(66-83) 122/78     Weight: 93.7 kg (206 lb 9.1 oz)  Body mass index is 30.51 kg/m².    SpO2: 97 %  O2 Device (Oxygen Therapy): nasal cannula      Intake/Output Summary (Last 24 hours) at 4/2/2020 1100  Last data filed at 4/1/2020 1600  Gross per 24 hour   Intake 570 ml   Output --   Net 570 ml       Lines/Drains/Airways     Peripheral Intravenous Line                 Peripheral IV - Single Lumen 03/31/20 1118 20 G Left Antecubital 1 day                Physical Exam examined by me, presently pulses oxygen saturation is 96% on 4 L of oxygen.  Pulses irregularly irregular I note the heart rate increased when he sat up for me.  At rest his heart rate was 98 sitting up heart rate got up to 150.  Heart sounds are normal there is no cardiac murmur or rub  Lungs he has coarse crepitations CHCF up posteriorly.  There is no bronchial  breathing or wheezes.  CNS is alert and oriented x3    Significant Labs:   CMP   Recent Labs   Lab 03/31/20  1125 04/01/20  0409 04/02/20  0502   * 130* 134*   K 3.3* 3.2* 3.7   CL 94* 100 103   CO2 22* 16* 17*   * 122* 114*   BUN 19 15 23   CREATININE 1.2 1.0 1.0   CALCIUM 8.7 8.1* 8.7   PROT 7.9  --   --    ALBUMIN 3.4*  --   --    BILITOT 1.0  --   --    ALKPHOS 67  --   --    AST 83*  --   --    ALT 65*  --   --    ANIONGAP 15 14 14   ESTGFRAFRICA >60.0 >60.0 >60.0   EGFRNONAA >60.0 >60.0 >60.0   , CBC   Recent Labs   Lab 03/31/20  1125 04/01/20  0409 04/02/20  0502   WBC 11.65 13.27* 14.03*   HGB 15.7 14.1 15.4   HCT 43.5 37.1* 43.2    290 349    and Troponin   Recent Labs   Lab 03/31/20  1125 04/02/20  0502   TROPONINI <0.030 <0.030       Significant Imaging:   BNP  Recent Labs   Lab 04/02/20  0502   *     Assessment and Plan:  COVID-19 associated bilateral pneumonia.  Persistent atrial fibrillation.  Plan  The fact his troponins are normal and the BNP is very marginally elevated indicates he does not have myocarditis or any significant LV dysfunction.  CHADS-VASC score is 0  I recommend we treat the atrial fibrillation considered with the aspirin, metoprolol and digoxin.     Brief HPI:     Active Diagnoses:    Diagnosis Date Noted POA    PRINCIPAL PROBLEM:  COVID-19 virus infection [U07.1] 03/31/2020 Yes    New onset Atrial fibrillation with RVR [I48.91] 04/01/2020 Yes    Hypokalemia [E87.6] 04/01/2020 Yes    Hyponatremia [E87.1] 04/01/2020 Yes    Transaminitis [R74.0] 04/01/2020 Yes    Diarrhea [R19.7] 04/01/2020 Yes    Pneumonia due to COVID-19 virus [U07.1, J12.89] 04/01/2020 Yes    Atypical pneumonia [J18.9] 03/31/2020 Yes    Shortness of breath [R06.02] 03/31/2020 Yes      Problems Resolved During this Admission:       VTE Risk Mitigation (From admission, onward)         Ordered     enoxaparin injection 40 mg  Daily      03/31/20 1506     IP VTE HIGH RISK PATIENT  Once       03/31/20 1506                Avila Orlando MD  Cardiology  [facility]

## 2020-04-03 LAB
ALBUMIN SERPL BCP-MCNC: 2.8 G/DL (ref 3.5–5.2)
ALP SERPL-CCNC: 58 U/L (ref 55–135)
ALT SERPL W/O P-5'-P-CCNC: 67 U/L (ref 10–44)
ANION GAP SERPL CALC-SCNC: 14 MMOL/L (ref 8–16)
AST SERPL-CCNC: 41 U/L (ref 10–40)
BASOPHILS # BLD AUTO: 0.04 K/UL (ref 0–0.2)
BASOPHILS NFR BLD: 0.4 % (ref 0–1.9)
BILIRUB SERPL-MCNC: 0.8 MG/DL (ref 0.1–1)
BUN SERPL-MCNC: 19 MG/DL (ref 8–23)
CALCIUM SERPL-MCNC: 8.6 MG/DL (ref 8.7–10.5)
CHLORIDE SERPL-SCNC: 100 MMOL/L (ref 95–110)
CO2 SERPL-SCNC: 18 MMOL/L (ref 23–29)
CREAT SERPL-MCNC: 0.9 MG/DL (ref 0.5–1.4)
DIFFERENTIAL METHOD: ABNORMAL
EOSINOPHIL # BLD AUTO: 0.1 K/UL (ref 0–0.5)
EOSINOPHIL NFR BLD: 1.2 % (ref 0–8)
ERYTHROCYTE [DISTWIDTH] IN BLOOD BY AUTOMATED COUNT: 11.9 % (ref 11.5–14.5)
EST. GFR  (AFRICAN AMERICAN): >60 ML/MIN/1.73 M^2
EST. GFR  (NON AFRICAN AMERICAN): >60 ML/MIN/1.73 M^2
GLUCOSE SERPL-MCNC: 111 MG/DL (ref 70–110)
HCT VFR BLD AUTO: 43.1 % (ref 40–54)
HGB BLD-MCNC: 15.6 G/DL (ref 14–18)
IMM GRANULOCYTES # BLD AUTO: 0.23 K/UL (ref 0–0.04)
IMM GRANULOCYTES NFR BLD AUTO: 2 % (ref 0–0.5)
LYMPHOCYTES # BLD AUTO: 1.9 K/UL (ref 1–4.8)
LYMPHOCYTES NFR BLD: 16.2 % (ref 18–48)
MAGNESIUM SERPL-MCNC: 2.1 MG/DL (ref 1.6–2.6)
MCH RBC QN AUTO: 33 PG (ref 27–31)
MCHC RBC AUTO-ENTMCNC: 36.2 G/DL (ref 32–36)
MCV RBC AUTO: 91 FL (ref 82–98)
MONOCYTES # BLD AUTO: 0.8 K/UL (ref 0.3–1)
MONOCYTES NFR BLD: 7.2 % (ref 4–15)
NEUTROPHILS # BLD AUTO: 8.3 K/UL (ref 1.8–7.7)
NEUTROPHILS NFR BLD: 73 % (ref 38–73)
NRBC BLD-RTO: 0 /100 WBC
PLATELET # BLD AUTO: 419 K/UL (ref 150–350)
PMV BLD AUTO: 9.4 FL (ref 9.2–12.9)
POTASSIUM SERPL-SCNC: 3.9 MMOL/L (ref 3.5–5.1)
PROT SERPL-MCNC: 6.8 G/DL (ref 6–8.4)
RBC # BLD AUTO: 4.73 M/UL (ref 4.6–6.2)
SODIUM SERPL-SCNC: 132 MMOL/L (ref 136–145)
WBC # BLD AUTO: 11.41 K/UL (ref 3.9–12.7)

## 2020-04-03 PROCEDURE — 25000003 PHARM REV CODE 250: Performed by: INTERNAL MEDICINE

## 2020-04-03 PROCEDURE — 83735 ASSAY OF MAGNESIUM: CPT

## 2020-04-03 PROCEDURE — 63700000 PHARM REV CODE 250 ALT 637 W/O HCPCS: Performed by: INTERNAL MEDICINE

## 2020-04-03 PROCEDURE — 27000221 HC OXYGEN, UP TO 24 HOURS

## 2020-04-03 PROCEDURE — 63600175 PHARM REV CODE 636 W HCPCS: Performed by: INTERNAL MEDICINE

## 2020-04-03 PROCEDURE — 93005 ELECTROCARDIOGRAM TRACING: CPT | Performed by: INTERNAL MEDICINE

## 2020-04-03 PROCEDURE — 21400001 HC TELEMETRY ROOM

## 2020-04-03 PROCEDURE — 80053 COMPREHEN METABOLIC PANEL: CPT

## 2020-04-03 PROCEDURE — 85025 COMPLETE CBC W/AUTO DIFF WBC: CPT

## 2020-04-03 PROCEDURE — 36415 COLL VENOUS BLD VENIPUNCTURE: CPT

## 2020-04-03 RX ORDER — DIGOXIN 125 MCG
0.12 TABLET ORAL DAILY
Status: DISCONTINUED | OUTPATIENT
Start: 2020-04-03 | End: 2020-04-05 | Stop reason: HOSPADM

## 2020-04-03 RX ORDER — METOPROLOL SUCCINATE 25 MG/1
25 TABLET, EXTENDED RELEASE ORAL DAILY
Status: DISCONTINUED | OUTPATIENT
Start: 2020-04-04 | End: 2020-04-05 | Stop reason: HOSPADM

## 2020-04-03 RX ADMIN — LEVOTHYROXINE SODIUM 100 MCG: 100 TABLET ORAL at 06:04

## 2020-04-03 RX ADMIN — METOPROLOL SUCCINATE 12.5 MG: 25 TABLET, EXTENDED RELEASE ORAL at 10:04

## 2020-04-03 RX ADMIN — MUPIROCIN: 20 OINTMENT TOPICAL at 10:04

## 2020-04-03 RX ADMIN — DIGOXIN 0.12 MG: 125 TABLET ORAL at 01:04

## 2020-04-03 RX ADMIN — ENOXAPARIN SODIUM 40 MG: 100 INJECTION SUBCUTANEOUS at 10:04

## 2020-04-03 RX ADMIN — ACETAMINOPHEN 650 MG: 325 TABLET ORAL at 10:04

## 2020-04-03 RX ADMIN — CHLORHEXIDINE GLUCONATE 15 ML: 1.2 RINSE ORAL at 10:04

## 2020-04-03 RX ADMIN — NITAZOXANIDE 500 MG: 500 TABLET ORAL at 10:04

## 2020-04-03 RX ADMIN — AZITHROMYCIN MONOHYDRATE 250 MG: 250 TABLET ORAL at 10:04

## 2020-04-03 RX ADMIN — ZOLPIDEM TARTRATE 5 MG: 5 TABLET ORAL at 10:04

## 2020-04-03 RX ADMIN — ASPIRIN 81 MG: 81 TABLET, DELAYED RELEASE ORAL at 10:04

## 2020-04-03 RX ADMIN — HYDROXYCHLOROQUINE SULFATE 400 MG: 200 TABLET, FILM COATED ORAL at 10:04

## 2020-04-03 RX ADMIN — CEFTRIAXONE 1 G: 1 INJECTION, SOLUTION INTRAVENOUS at 04:04

## 2020-04-03 NOTE — PROGRESS NOTES
[facility] Medicine    Progress Note    Patient Name: Rashawn Dai  MRN: [id number]  Patient Class: IP- Inpatient   Admission Date: 3/31/2020 10:45 AM  Length of Stay: 3  Attending Physician: SHARITA MURPHY MD  Primary Care Provider: Silver Rodriguez MD  Face-to-Face encounter date: 04/03/2020  Chief Complaint: Headache (X 1 WEEK, POS SCREEN); Diarrhea; Shortness of Breath (TODAY); and Fever         Patient ID: Rashawn Dai is a 64 y.o. male admitted for   Active Hospital Problems    Diagnosis  POA    *COVID-19 virus infection [U07.1]  Yes    New onset Atrial fibrillation with RVR [I48.91]  Yes    Hypokalemia [E87.6]  Yes    Hyponatremia [E87.1]  Yes    Transaminitis [R74.0]  Yes    Diarrhea [R19.7]  Yes    Pneumonia due to COVID-19 virus [U07.1, J12.89]  Yes    Atypical pneumonia [J18.9]  Yes    Shortness of breath [R06.02]  Yes      Resolved Hospital Problems   No resolved problems to display.      HPI by Shelia Almanzar NP on 03/31/2020: History was obtained from the patient and ER physician Sign-out. Patient presented with headache, cough, shortness of breath and fever x 9 days. His symptoms are progressively getting worst. Other associated symptoms include diarrhea and fatigue. Denies any alleviating or worsening factors. He is a  and reports he has come into contact with coworkers who tested positive for COVID 19.  Patient was seen at outside clinic a few days ago and treated with azithromycin with no improvement. Patient decided to come to the ER for further evaluation today after becoming SOB.      In the emergency room, patient was saturating in low 90s on room air. He required 2L oxygen.  He is febrile. Patient CBC was unremarkable. CMP was unremarkable. CRP is pending. BNP and troponin was negative. Reviewed EKG which shows tachycardia 110s. HR progressed to 130s while in the ED. No QTC prolongation. He is given 500cc NS. CXR shows bilateral infiltratres  consistent with COVID 19.      Decision to admit was taken and patient was informed about the plan of care.     Subjective:    Interval History: Patient seen and examined this morning.  Patient laying in bed.  Reports he still very weak and become easily short of breath on mild exertion.  Noted patient has been afebrile for the last 2 days.  Discussed with patient that I will consult PT and OT to get him evaluated for his weakness.  Verbalized understanding  No Family present at bedside.  No concerns/issues overnight reported by the patient or the nursing staff.    Review of Systems All other Review of Systems were found to be negative expect for that mentioned already in HPI.     Objective:     Physical Exam  Vitals:    04/03/20 0743   BP: (!) 131/95   Pulse: 74   Resp: 19   Temp: 97.6 °F (36.4 °C)     Wt Readings from Last 1 Encounters:   04/01/20 93.7 kg (206 lb 9.1 oz)      Body mass index is 30.51 kg/m².    Vitals reviewed.  Constitutional: No distress.  Looks sick  HENT: NC, with supplemental oxygen at 3 liters/minute and O2 sat of 96% this morning   Head: Atraumatic.   Cardiovascular: Irregularly irregular  Pulmonary/Chest:  bilateral basal crepitations, occasional expiratory wheezes  Abdominal: Soft. Bowel sounds are normal. No distension and no mass. No tenderness  Neurological: Alert.   Skin: Skin is warm and dry.     Following labs were Reviewed   CBC:  Recent Labs   Lab 04/03/20  0606   WBC 11.41   HGB 15.6   HCT 43.1   *     CMP:  Recent Labs   Lab 04/03/20  0606   CALCIUM 8.6*   ALBUMIN 2.8*   PROT 6.8   *   K 3.9   CO2 18*      BUN 19   CREATININE 0.9   ALKPHOS 58   ALT 67*   AST 41*   BILITOT 0.8     Last 72 hour POCT GLUCOSE  No results found for: POCTGLUCOSE     Microbiology Results (last 7 days)     Procedure Component Value Units Date/Time    Stool culture [[id number]] Collected:  03/31/20 1935    Order Status:  Completed Specimen:  Stool Updated:  04/03/20 1113     Stool  Culture No growth      No enteric collin    Blood culture [[id number]] Collected:  03/31/20 1125    Order Status:  Completed Specimen:  Blood from Peripheral, Antecubital, Left Updated:  04/02/20 1232     Blood Culture, Routine No Growth to date      No Growth to date      No Growth to date    Blood culture [[id number]] Collected:  03/31/20 1128    Order Status:  Completed Specimen:  Blood from Peripheral, Antecubital, Left Updated:  04/02/20 1232     Blood Culture, Routine No Growth to date      No Growth to date      No Growth to date    Clostridium difficile EIA [[id number]] Collected:  03/31/20 2032    Order Status:  Completed Specimen:  Stool Updated:  04/01/20 1147     C. diff Antigen Negative     C difficile Toxins A+B, EIA Negative     Comment: Testing not recommended for children <24 months old.       Narrative:       ADD ON IF POSSIBLE    Culture, Respiratory [[id number]] Collected:  04/01/20 0736    Order Status:  Completed Specimen:  Respiratory from Sputum Updated:  04/01/20 0916     Respiratory Culture Specimen inadequate - culture not performed     Gram Stain (Respiratory) >10epis/lfp and <than many WBC's     Gram Stain (Respiratory) Moderate Gram positive cocci     Gram Stain (Respiratory) Moderate Gram positive rods     Gram Stain (Respiratory) Moderate Gram negative rods     Gram Stain (Respiratory) Predominance of oropharyngeal collin. Please recollect.     Gram Stain (Respiratory) Results called to and read back by:Mellisa Jon RN 2BICU  04/01/2020       Gram Stain (Respiratory) 09:15 JBM            X-Ray Chest AP Portable   Final Result      Interstitial and ground-glass opacities bilaterally, typical of COVID pneumonia, minimally worsened compared to prior.         Electronically signed by: Trey Rivas MD   Date:    04/02/2020   Time:    11:29      X-Ray Chest AP Portable   Final Result      Right greater than left, mixed interstitial and alveolar opacities at the lung bases suggesting multifocal  pneumonia (and less likely edema, ARDS or aspiration).  Consider radiographic follow-up to document resolution.      RESULT NOTIFICATION: These observations were discussed by the dictating physician, by phone with, and acknowledged by Javy Talavera at 11:09 on 3/31/2020.         Electronically signed by: Jax Cook MD   Date:    03/31/2020   Time:    11:12            Scheduled Meds:   aspirin  81 mg Oral Daily    azithromycin  250 mg Oral Daily    cefTRIAXone (ROCEPHIN) IVPB  1 g Intravenous Q24H    chlorhexidine  15 mL Mouth/Throat BID    digoxin  0.125 mg Oral Daily    enoxaparin  40 mg Subcutaneous Daily    hydroxychloroquine  400 mg Oral Daily    levothyroxine  100 mcg Oral Before breakfast    metoprolol succinate  12.5 mg Oral Daily    mupirocin   Nasal BID     Continuous Infusions:  PRN Meds:.acetaminophen, magnesium oxide, magnesium oxide, ondansetron, potassium chloride 10%, potassium chloride 10%, potassium chloride 10%, potassium, sodium phosphates, potassium, sodium phosphates, potassium, sodium phosphates, sodium chloride 0.9%, zolpidem    04/01/2020:   04/02/2020: QTc 371  04/03/2020: QTc 397    Assessment & Plan:     Covid 19 Pneumonia; Shortness of Breath  Covid PCR detected    Admit to ICU, downgraded to cardiac floor on 04/01/2020  Consulted ID, appreciate the recs  Cont IV Azithromycin/Rocephin.  ProCal trending down  Continue Hydroxychloroquine 400 mg daily x 7 days, due to his slow recovery  Supplemental Oxygen, currently at 3 LPM with ox sat at 96%  Resp cx shows predominance of oropharyngeal collin.  Lovenox for VTE prophylaxis  Ferritin 3143 on admission, repeat in a.m.  CPR 25.08 on admission, repeat in a.m.   today  Patient still very weak and lethargic, will consult PT and OT to evaluate  Will also need evaluation for home oxygen around the time when D/C is planned    Afib with RVR- New onset  Likely secondary to dehydration or fever or covered pneumonia  NAHOMY  VASC score is 0  Continuous cardiac monitoring  Continue metoprolol succinate 12.5 mg daily, monitor BP   Digoxin 0.125 mg daily  Dr. Orlando on board.  Appreciate the input  Discussed case personally with Dr. Orlando recommended patient is cardiac-wise stable to be discharged.  Recommended discharging patient on metoprolol 25 mg daily, aspirin 81 mg daily and digoxin 0.125 mg daily when ready to be discharged and cleared medically.  Recommended patient follow-up with him in 3 weeks post hospital discharge.     Hyponatremia  Monitor   Compared to 6/2018 labs and Na was normal then    Electrolyte Imbalance  K 3.9  Replace per protocol     Tranaminitis- improving  Probably due to covered infection  AST ALT 41/67, was 83/65 on admission  Monitor    Diarrhea  C Diff negative  Possible Covid Diarrhea   Pt also started on Alinia BID x 3 days on admission - treatment completed        Discharge Planning:   Patient still not ready to be discharged.  Consulted PT OT    Above encounter included review of the medical records, interviewing and examining the patient face-to-face, discussion with family and other health care providers, ordering and interpreting lab/test results and formulating a plan of care.     Medical Decision Making:      [_] Low Complexity  [_] Moderate Complexity  [x] High Complexity      Silas Carrasco MD  Department of Hospital Medicine   [facility]

## 2020-04-03 NOTE — PLAN OF CARE
Pt on isolation precautions for covid. Pt has sob on exertion. Performing adls with minimal assistance. No c/o pain. Educated on plan of care and safety.

## 2020-04-04 LAB
ANION GAP SERPL CALC-SCNC: 10 MMOL/L (ref 8–16)
BASOPHILS # BLD AUTO: 0.06 K/UL (ref 0–0.2)
BASOPHILS NFR BLD: 0.6 % (ref 0–1.9)
BUN SERPL-MCNC: 17 MG/DL (ref 8–23)
CALCIUM SERPL-MCNC: 8.5 MG/DL (ref 8.7–10.5)
CHLORIDE SERPL-SCNC: 103 MMOL/L (ref 95–110)
CO2 SERPL-SCNC: 22 MMOL/L (ref 23–29)
CREAT SERPL-MCNC: 0.9 MG/DL (ref 0.5–1.4)
CRP SERPL-MCNC: 4.72 MG/DL (ref 0–0.75)
DIFFERENTIAL METHOD: ABNORMAL
EOSINOPHIL # BLD AUTO: 0.2 K/UL (ref 0–0.5)
EOSINOPHIL NFR BLD: 1.9 % (ref 0–8)
ERYTHROCYTE [DISTWIDTH] IN BLOOD BY AUTOMATED COUNT: 11.9 % (ref 11.5–14.5)
EST. GFR  (AFRICAN AMERICAN): >60 ML/MIN/1.73 M^2
EST. GFR  (NON AFRICAN AMERICAN): >60 ML/MIN/1.73 M^2
FERRITIN SERPL-MCNC: 2577 NG/ML (ref 20–300)
GLUCOSE SERPL-MCNC: 85 MG/DL (ref 70–110)
HCT VFR BLD AUTO: 43 % (ref 40–54)
HGB BLD-MCNC: 15.5 G/DL (ref 14–18)
IMM GRANULOCYTES # BLD AUTO: 0.24 K/UL (ref 0–0.04)
IMM GRANULOCYTES NFR BLD AUTO: 2.4 % (ref 0–0.5)
LYMPHOCYTES # BLD AUTO: 2.3 K/UL (ref 1–4.8)
LYMPHOCYTES NFR BLD: 22.6 % (ref 18–48)
MAGNESIUM SERPL-MCNC: 2.2 MG/DL (ref 1.6–2.6)
MAGNESIUM SERPL-MCNC: 2.2 MG/DL (ref 1.6–2.6)
MCH RBC QN AUTO: 33.9 PG (ref 27–31)
MCHC RBC AUTO-ENTMCNC: 36 G/DL (ref 32–36)
MCV RBC AUTO: 94 FL (ref 82–98)
MONOCYTES # BLD AUTO: 0.8 K/UL (ref 0.3–1)
MONOCYTES NFR BLD: 8.1 % (ref 4–15)
NEUTROPHILS # BLD AUTO: 6.4 K/UL (ref 1.8–7.7)
NEUTROPHILS NFR BLD: 64.4 % (ref 38–73)
NRBC BLD-RTO: 0 /100 WBC
PLATELET # BLD AUTO: 466 K/UL (ref 150–350)
PMV BLD AUTO: 9.8 FL (ref 9.2–12.9)
POTASSIUM SERPL-SCNC: 4 MMOL/L (ref 3.5–5.1)
RBC # BLD AUTO: 4.57 M/UL (ref 4.6–6.2)
SODIUM SERPL-SCNC: 135 MMOL/L (ref 136–145)
STOOL CULTURE: NORMAL
WBC # BLD AUTO: 9.99 K/UL (ref 3.9–12.7)

## 2020-04-04 PROCEDURE — 94761 N-INVAS EAR/PLS OXIMETRY MLT: CPT

## 2020-04-04 PROCEDURE — 97165 OT EVAL LOW COMPLEX 30 MIN: CPT

## 2020-04-04 PROCEDURE — 82728 ASSAY OF FERRITIN: CPT

## 2020-04-04 PROCEDURE — 25000003 PHARM REV CODE 250: Performed by: INTERNAL MEDICINE

## 2020-04-04 PROCEDURE — 36415 COLL VENOUS BLD VENIPUNCTURE: CPT

## 2020-04-04 PROCEDURE — 63600175 PHARM REV CODE 636 W HCPCS: Performed by: INTERNAL MEDICINE

## 2020-04-04 PROCEDURE — 85025 COMPLETE CBC W/AUTO DIFF WBC: CPT

## 2020-04-04 PROCEDURE — 99900035 HC TECH TIME PER 15 MIN (STAT)

## 2020-04-04 PROCEDURE — 63700000 PHARM REV CODE 250 ALT 637 W/O HCPCS: Performed by: INTERNAL MEDICINE

## 2020-04-04 PROCEDURE — 86140 C-REACTIVE PROTEIN: CPT

## 2020-04-04 PROCEDURE — 97535 SELF CARE MNGMENT TRAINING: CPT

## 2020-04-04 PROCEDURE — 21400001 HC TELEMETRY ROOM

## 2020-04-04 PROCEDURE — 94618 PULMONARY STRESS TESTING: CPT

## 2020-04-04 PROCEDURE — 83735 ASSAY OF MAGNESIUM: CPT | Mod: 91

## 2020-04-04 PROCEDURE — 97161 PT EVAL LOW COMPLEX 20 MIN: CPT

## 2020-04-04 PROCEDURE — 83735 ASSAY OF MAGNESIUM: CPT

## 2020-04-04 PROCEDURE — 27000221 HC OXYGEN, UP TO 24 HOURS

## 2020-04-04 PROCEDURE — 93005 ELECTROCARDIOGRAM TRACING: CPT | Performed by: INTERNAL MEDICINE

## 2020-04-04 PROCEDURE — 80048 BASIC METABOLIC PNL TOTAL CA: CPT

## 2020-04-04 PROCEDURE — 25000003 PHARM REV CODE 250: Performed by: NURSE PRACTITIONER

## 2020-04-04 RX ADMIN — APIXABAN 5 MG: 5 TABLET, FILM COATED ORAL at 01:04

## 2020-04-04 RX ADMIN — CHLORHEXIDINE GLUCONATE 15 ML: 1.2 RINSE ORAL at 10:04

## 2020-04-04 RX ADMIN — HYDROXYCHLOROQUINE SULFATE 400 MG: 200 TABLET, FILM COATED ORAL at 10:04

## 2020-04-04 RX ADMIN — METOPROLOL SUCCINATE 25 MG: 25 TABLET, FILM COATED, EXTENDED RELEASE ORAL at 10:04

## 2020-04-04 RX ADMIN — ACETAMINOPHEN 650 MG: 325 TABLET ORAL at 10:04

## 2020-04-04 RX ADMIN — LEVOTHYROXINE SODIUM 100 MCG: 100 TABLET ORAL at 05:04

## 2020-04-04 RX ADMIN — AZITHROMYCIN MONOHYDRATE 250 MG: 250 TABLET ORAL at 10:04

## 2020-04-04 RX ADMIN — ACETAMINOPHEN 650 MG: 325 TABLET ORAL at 01:04

## 2020-04-04 RX ADMIN — CEFTRIAXONE 1 G: 1 INJECTION, SOLUTION INTRAVENOUS at 03:04

## 2020-04-04 RX ADMIN — DIGOXIN 0.12 MG: 125 TABLET ORAL at 10:04

## 2020-04-04 RX ADMIN — MUPIROCIN: 20 OINTMENT TOPICAL at 10:04

## 2020-04-04 RX ADMIN — ZOLPIDEM TARTRATE 5 MG: 5 TABLET ORAL at 10:04

## 2020-04-04 RX ADMIN — ASPIRIN 81 MG: 81 TABLET, DELAYED RELEASE ORAL at 10:04

## 2020-04-04 RX ADMIN — APIXABAN 5 MG: 5 TABLET, FILM COATED ORAL at 10:04

## 2020-04-04 NOTE — PROGRESS NOTES
[facility]  Cardiology  Progress Note    Patient Name: Rashawn Dai  MRN: [id number]  Admission Date: 3/31/2020  Hospital Length of Stay: 4 days  Code Status: Full Code   Attending Physician: Silas Carrasco MD   Primary Care Physician: Silver Rodriguez MD  Expected Discharge Date:   Principal Problem:COVID-19 virus infection    Subjective:     Hospital Course:  Admitted to hospital on 03/31/2020 is with COVID-19.  04/01/2020 moved out of ICU on to cardiology B floor      Interval History: Discussed patient's status with his primary nurse. Patient continues to have some shortness of breath on exertion.     ROS:    Per hospitalist and nurse report:    +dyspnea on exertion  + generalized weakness   Objective:     Vital Signs (Most Recent):  Temp: 98.5 °F (36.9 °C) (04/04/20 0705)  Pulse: 107 (04/04/20 0705)  Resp: (!) 22 (04/04/20 0705)  BP: (!) 108/56 (04/04/20 0705)  SpO2: 96 % (04/04/20 0705) Vital Signs (24h Range):  Temp:  [96.6 °F (35.9 °C)-98.5 °F (36.9 °C)] 98.5 °F (36.9 °C)  Pulse:  [] 107  Resp:  [18-22] 22  SpO2:  [96 %-98 %] 96 %  BP: (108-127)/(56-86) 108/56     Weight: 92.7 kg (204 lb 5.9 oz)  Body mass index is 30.18 kg/m².    SpO2: 96 %  O2 Device (Oxygen Therapy): nasal cannula      Intake/Output Summary (Last 24 hours) at 4/4/2020 1008  Last data filed at 4/4/2020 0400  Gross per 24 hour   Intake 170 ml   Output --   Net 170 ml       Lines/Drains/Airways     Peripheral Intravenous Line                 Peripheral IV - Single Lumen 03/31/20 1118 20 G Left Antecubital 3 days                Physical Exam     As per Hospitalist:    Constitutional: No distress.  Looks sick  HENT: NC, with supplemental oxygen at 3 liters/minute and O2 sat of 96% this morning   Head: Atraumatic.   Cardiovascular: Irregularly irregular  Pulmonary/Chest:  bilateral basal crepitations, occasional expiratory wheezes  Abdominal: Soft. Bowel sounds are normal. No distension and no mass. No tenderness  Neurological:  Alert.   Skin: Skin is warm and dry.     Significant Labs:   CMP   Recent Labs   Lab 04/03/20  0606   *   K 3.9      CO2 18*   *   BUN 19   CREATININE 0.9   CALCIUM 8.6*   PROT 6.8   ALBUMIN 2.8*   BILITOT 0.8   ALKPHOS 58   AST 41*   ALT 67*   ANIONGAP 14   ESTGFRAFRICA >60.0   EGFRNONAA >60.0   , CBC   Recent Labs   Lab 04/03/20  0606   WBC 11.41   HGB 15.6   HCT 43.1   *    and Troponin   No results for input(s): TROPONINI in the last 48 hours.    Significant Imaging:   BNP  Recent Labs   Lab 04/02/20  0502   *     Assessment and Plan:  COVID-19 associated bilateral pneumonia.  Persistent atrial fibrillation    Plan    1. [id number] is 0. However patient remains in Afib in the low 100's this AM. Would like to see better rate control however he has also been borderline hypotensive. HR should improve as he recovers from the COVID 19 virus.   2. Patient remains at risk for stroke. Will start him on Eliquis 5 mg po BID and dc aspirin and Lovenox.  3. Continue metoprolol succinate 25 mg po daily and digoxin 125 mcg po daily.   4. Trend labs daily. Please replace potassium and magnesium as needed. Goal for potassium is 4.0. Goal for magnesium is 2.0.   5. Patient is receiving azithromycin and hydroxychloroquine. QTc is normal.   6. Will follow up with him tomorrow.      Brief HPI:     Active Diagnoses:    Diagnosis Date Noted POA    PRINCIPAL PROBLEM:  COVID-19 virus infection [U07.1] 03/31/2020 Yes    New onset Atrial fibrillation with RVR [I48.91] 04/01/2020 Yes    Hypokalemia [E87.6] 04/01/2020 Yes    Hyponatremia [E87.1] 04/01/2020 Yes    Transaminitis [R74.0] 04/01/2020 Yes    Diarrhea [R19.7] 04/01/2020 Yes    Pneumonia due to COVID-19 virus [U07.1, J12.89] 04/01/2020 Yes    Atypical pneumonia [J18.9] 03/31/2020 Yes    Shortness of breath [R06.02] 03/31/2020 Yes      Problems Resolved During this Admission:       VTE Risk Mitigation (From admission, onward)          Ordered     enoxaparin injection 40 mg  Daily      03/31/20 1506     IP VTE HIGH RISK PATIENT  Once      03/31/20 1506                Lanny Humphrey, BALDEV  Cardiology  [facility]

## 2020-04-04 NOTE — PT/OT/SLP EVAL
Occupational Therapy   Evaluation and Discharge Note    Name: Rashawn Dai  MRN: [id number]  Admitting Diagnosis:  COVID-19 virus infection      Recommendations:     Discharge Recommendations: home  Discharge Equipment Recommendations:  none  Barriers to discharge:  None    Assessment:     Rashawn Dai is a 64 y.o. male with a medical diagnosis of COVID-19 virus infection. At this time, patient is functioning at their prior level of function and does not require further acute OT services.     Plan:     During this hospitalization, patient does not require further acute OT services.  Please re-consult if situation changes.    · Plan of Care Reviewed with: patient    Subjective     Chief Complaint: SOB with activity  Patient/Family Comments/goals: to go home.    Occupational Profile:  Living Environment: lives with spouse in a 1 story home with no steps to enter.  Previous level of function: Independent with ADLs, IADLs, working and driving.  Roles and Routines: primary homemaker  Equipment Used at home:  none  Assistance upon Discharge: Spouse    Pain/Comfort:  · Pain Rating 1: 0/10  · Pain Rating Post-Intervention 1: 0/10    Patients cultural, spiritual, Taoist conflicts given the current situation: no    Objective:     Communicated with: nurse prior to session.  Patient found HOB elevated with peripheral IV, telemetry, oxygen, pulse ox (continuous) upon OT entry to room.    General Precautions: Standard, (None)   Orthopedic Precautions:N/A   Braces: N/A     Occupational Performance:    Bed Mobility:    · Patient completed Scooting/Bridging with supervision  · Patient completed Supine to Sit with supervision  · Patient completed Sit to Supine with supervision    Functional Mobility/Transfers:  · Patient completed Toilet Transfer Step Transfer technique with supervision with  no AD  · Functional Mobility: ambulated 20 feet in the hospital room with supervision and no AD.    Activities of Daily  Living:  · Grooming: supervision to wash hands standing at the sink  · Upper Body Dressing: supervision to don/doff gown sitting EOB.  · Lower Body Dressing: supervision to don/doff socks sitting EOB.  · Toileting: supervision to perform toileting.     Cognitive/Visual Perceptual:  Cognitive/Psychosocial Skills:     -       Oriented to: Person, Place, Time and Situation   -       Follows Commands/attention:Follows multistep  commands  -       Communication: clear/fluent  -       Memory: No Deficits noted  -       Safety awareness/insight to disability: intact   -       Mood/Affect/Coping skills/emotional control: Cooperative and Pleasant  Visual/Perceptual:      -Intact Acuity    Physical Exam:  Balance:    -       Sitting/Standing: Supervision  Upper Extremity Range of Motion:     -       Right Upper Extremity: WFL  -       Left Upper Extremity: WFL  Upper Extremity Strength:    -       Right Upper Extremity: WFL  -       Left Upper Extremity: WFL   Strength:    -       Right Upper Extremity: WFL  -       Left Upper Extremity: WFL  Fine Motor Coordination:    -       Intact    AMPAC 6 Click ADL:  AMPAC Total Score: 24    Treatment & Education:  Patient able to perform sitting/standing ADLs in the hospital room with supervision and no safety concerns. O2 saturation ranged from 96-90% on 3L O2 via NC during session.   Education:    Patient left HOB elevated with all lines intact and call button in reach    GOALS:   Multidisciplinary Problems     Occupational Therapy Goals     Not on file                History:     Past Medical History:   Diagnosis Date    Thyroid disease        No past surgical history on file.    Time Tracking:     OT Date of Treatment:    OT Start Time: 1037  OT Stop Time: 1101  OT Total Time (min): 24 min    Billable Minutes:Evaluation 10  Self Care/Home Management 14    Wyatt Rubin OT  4/4/2020

## 2020-04-04 NOTE — PROGRESS NOTES
[facility] Medicine    Progress Note    Patient Name: Rashawn Dai  MRN: [id number]  Patient Class: IP- Inpatient   Admission Date: 3/31/2020 10:45 AM  Length of Stay: 4  Attending Physician: SHARITA MURPHY MD  Primary Care Provider: Silver Rodriguez MD  Face-to-Face encounter date: 04/04/2020  Chief Complaint: Headache (X 1 WEEK, POS SCREEN); Diarrhea; Shortness of Breath (TODAY); and Fever         Patient ID: Rashawn Dai is a 64 y.o. male admitted for   Active Hospital Problems    Diagnosis  POA    *COVID-19 virus infection [U07.1]  Yes    New onset Atrial fibrillation with RVR [I48.91]  Yes    Hypokalemia [E87.6]  Yes    Hyponatremia [E87.1]  Yes    Transaminitis [R74.0]  Yes    Diarrhea [R19.7]  Yes    Pneumonia due to COVID-19 virus [U07.1, J12.89]  Yes    Atypical pneumonia [J18.9]  Yes    Shortness of breath [R06.02]  Yes      Resolved Hospital Problems   No resolved problems to display.      HPI by Shelia Almanzar NP on 03/31/2020: History was obtained from the patient and ER physician Sign-out. Patient presented with headache, cough, shortness of breath and fever x 9 days. His symptoms are progressively getting worst. Other associated symptoms include diarrhea and fatigue. Denies any alleviating or worsening factors. He is a  and reports he has come into contact with coworkers who tested positive for COVID 19.  Patient was seen at outside clinic a few days ago and treated with azithromycin with no improvement. Patient decided to come to the ER for further evaluation today after becoming SOB.      In the emergency room, patient was saturating in low 90s on room air. He required 2L oxygen.  He is febrile. Patient CBC was unremarkable. CMP was unremarkable. CRP is pending. BNP and troponin was negative. Reviewed EKG which shows tachycardia 110s. HR progressed to 130s while in the ED. No QTC prolongation. He is given 500cc NS. CXR shows bilateral infiltratres  consistent with COVID 19.      Decision to admit was taken and patient was informed about the plan of care.     Subjective:    Interval History: Patient seen and examined this morning.  Patient feeling much better today.  Reports he is gaining his strength.  Was able to get up and go to the restroom by himself but with nasal oxygen as he gets severely short of breath without oxygen supplementation.  Discussed we will get him evaluated for home oxygen requirement.  Verbalized understanding.  Discussed with patient that if he looks as good as today, tomorrow we can discharge him home and he will have to self isolate himself for 2 weeks.  Patient requested I call his wife Trish and discussed the isolation, oxygen, his condition with her today.  No Family present at bedside.  No concerns/issues overnight reported by the patient or the nursing staff.    Review of Systems All other Review of Systems were found to be negative expect for that mentioned already in HPI.     Objective:     Physical Exam  Vitals:    04/04/20 0300   BP: 120/77   Pulse: 88   Resp: 18   Temp: 97.2 °F (36.2 °C)     Wt Readings from Last 1 Encounters:   04/04/20 92.7 kg (204 lb 5.9 oz)      Body mass index is 30.18 kg/m².    Vitals reviewed.  Constitutional: No distress.  Started to look much better  HENT: NC, with supplemental oxygen at 3 liters/minute and O2 sat of 97-98% this morning   Head: Atraumatic.   Cardiovascular: Irregularly irregular  Pulmonary/Chest:  fine bilateral basal crepitations, much improved. No wheezes  Abdominal: Soft. Bowel sounds are normal. No distension and no mass. No tenderness  Neurological: Alert.   Skin: Skin is warm and dry.     Following labs were Reviewed   CBC:  No results for input(s): WBC, HGB, HCT, PLT in the last 24 hours.  CMP:  No results for input(s): GLUCOSE, CALCIUM, ALBUMIN, PROT, NA, K, CO2, CL, BUN, CREATININE, ALKPHOS, ALT, AST, BILITOT in the last 24 hours.  Last 72 hour POCT GLUCOSE  No results  found for: POCTGLUCOSE     Microbiology Results (last 7 days)     Procedure Component Value Units Date/Time    Blood culture [[id number]] Collected:  03/31/20 1125    Order Status:  Completed Specimen:  Blood from Peripheral, Antecubital, Left Updated:  04/03/20 1232     Blood Culture, Routine No Growth to date      No Growth to date      No Growth to date      No Growth to date    Blood culture [[id number]] Collected:  03/31/20 1128    Order Status:  Completed Specimen:  Blood from Peripheral, Antecubital, Left Updated:  04/03/20 1232     Blood Culture, Routine No Growth to date      No Growth to date      No Growth to date      No Growth to date    Stool culture [[id number]] Collected:  03/31/20 1935    Order Status:  Completed Specimen:  Stool Updated:  04/03/20 1113     Stool Culture No growth      No enteric collin    Clostridium difficile EIA [[id number]] Collected:  03/31/20 2032    Order Status:  Completed Specimen:  Stool Updated:  04/01/20 1147     C. diff Antigen Negative     C difficile Toxins A+B, EIA Negative     Comment: Testing not recommended for children <24 months old.       Narrative:       ADD ON IF POSSIBLE    Culture, Respiratory [[id number]] Collected:  04/01/20 0736    Order Status:  Completed Specimen:  Respiratory from Sputum Updated:  04/01/20 0916     Respiratory Culture Specimen inadequate - culture not performed     Gram Stain (Respiratory) >10epis/lfp and <than many WBC's     Gram Stain (Respiratory) Moderate Gram positive cocci     Gram Stain (Respiratory) Moderate Gram positive rods     Gram Stain (Respiratory) Moderate Gram negative rods     Gram Stain (Respiratory) Predominance of oropharyngeal collin. Please recollect.     Gram Stain (Respiratory) Results called to and read back by:Mellisa Jon RN 2BICU  04/01/2020       Gram Stain (Respiratory) 09:15 JBM            X-Ray Chest AP Portable   Final Result      Interstitial and ground-glass opacities bilaterally, typical of COVID pneumonia,  minimally worsened compared to prior.         Electronically signed by: Trey Rivas MD   Date:    04/02/2020   Time:    11:29      X-Ray Chest AP Portable   Final Result      Right greater than left, mixed interstitial and alveolar opacities at the lung bases suggesting multifocal pneumonia (and less likely edema, ARDS or aspiration).  Consider radiographic follow-up to document resolution.      RESULT NOTIFICATION: These observations were discussed by the dictating physician, by phone with, and acknowledged by Javy Talavera at 11:09 on 3/31/2020.         Electronically signed by: Jax Cook MD   Date:    03/31/2020   Time:    11:12            Scheduled Meds:   aspirin  81 mg Oral Daily    azithromycin  250 mg Oral Daily    cefTRIAXone (ROCEPHIN) IVPB  1 g Intravenous Q24H    chlorhexidine  15 mL Mouth/Throat BID    digoxin  0.125 mg Oral Daily    enoxaparin  40 mg Subcutaneous Daily    hydroxychloroquine  400 mg Oral Daily    levothyroxine  100 mcg Oral Before breakfast    metoprolol succinate  25 mg Oral Daily    mupirocin   Nasal BID     Continuous Infusions:  PRN Meds:.acetaminophen, magnesium oxide, magnesium oxide, ondansetron, potassium chloride 10%, potassium chloride 10%, potassium chloride 10%, potassium, sodium phosphates, potassium, sodium phosphates, potassium, sodium phosphates, sodium chloride 0.9%, zolpidem    04/01/2020:   04/02/2020: QTc 371  04/03/2020: QTc 397    Assessment & Plan:     Covid 19 Pneumonia; Shortness of Breath  Covid PCR detected    Admit to ICU, downgraded to cardiac floor on 04/01/2020  Consulted ID, appreciate the recs  Cont IV Azithromycin/Rocephin.  ProCal trending down  Continue Hydroxychloroquine 400 mg daily x 7 days, due to his slow recovery  Supplemental Oxygen, currently at 3 LPM with ox sat at 98%  Resp cx shows predominance of oropharyngeal collin.  Ferritin 3143 on admission, repeat 2577  CPR 25.08 on admission, repeat 4.72    today  Patient still very weak and lethargic, will consult PT and OT to evaluate and recommended no services required   Consulted Respiratory therapy for eval for home oxygen    Afib with RVR- New onset  Likely secondary to dehydration or fever or covered pneumonia  CHADS VASC score is 0  Continuous cardiac monitoring  Continue metoprolol succinate 12.5 mg daily, monitor BP   Digoxin 0.125 mg daily  Dr. Orlando on board.  Appreciate the input  Discussed case personally with Dr. Orlando recommended patient is cardiac-wise stable to be discharged.  Recommended discharging patient on metoprolol 25 mg daily, aspirin 81 mg daily and digoxin 0.125 mg daily when ready to be discharged and cleared medically.    Dr FIONA Aquino started pt eliquis 5 mg po bid for now. ASA and lovenox discontinued  Recommended patient follow-up with him in 3 weeks post hospital discharge.     Hyponatremia  Monitor   Compared to 6/2018 labs and Na was normal then    Electrolyte Imbalance  K 3.9  Replace per protocol     Tranaminitis- improving  Probably due to covered infection  AST ALT 41/67, was 83/65 on admission  Monitor    Diarrhea  C Diff negative  Possible Covid Diarrhea   completed Alinia BID x 3 days on admission       Discharge Planning:   Possible tomorrow, awaiting resp eval for home oxygen    Above encounter included review of the medical records, interviewing and examining the patient face-to-face, discussion with family and other health care providers, ordering and interpreting lab/test results and formulating a plan of care.     Medical Decision Making:      [_] Low Complexity  [_] Moderate Complexity  [x] High Complexity      Silas Carrasco MD  Department of Hospital Medicine   [facility]

## 2020-04-04 NOTE — PT/OT/SLP EVAL
Physical Therapy Evaluation and Discharge Note    Patient Name:  Rashawn Dai   MRN:  [id number]    Recommendations:     Discharge Recommendations:      Discharge Equipment Recommendations: none   Barriers to discharge: None    Assessment:     Rashawn Dai is a 64 y.o. male admitted with a medical diagnosis of COVID-19 virus infection. .  At this time, patient is functioning at their prior level of function and does not require further acute PT services.     Recent Surgery: * No surgery found *      Plan:     During this hospitalization, patient does not require further acute PT services.  Please re-consult if situation changes.      Subjective     Chief Complaint: none  Patient/Family Comments/goals: D/C home  Pain/Comfort:  ·      Patients cultural, spiritual, Bahai conflicts given the current situation:      Living Environment:    Prior to admission, patients level of function was independent .  Equipment used at home: none.  DME owned (not currently used): none.      Objective:     Communicated with nurse prior to session.  Patient found supine with   upon PT entry to room.    General Precautions: Standard,     Orthopedic Precautions:    Braces:       Exams:  · Cognitive Exam:  Patient is oriented to Person, Place, Time and Situation  · RLE ROM: WNL  · RLE Strength: WNL  · LLE ROM: WNL  · LLE Strength: WNL    Functional Mobility:  · Bed Mobility:     · Supine to Sit: independence  · Transfers:     · Sit to Stand:  independence with no AD  · Gait: 100 ft no AD    AM-PAC 6 CLICK MOBILITY  Total Score:24       Therapeutic Activities and Exercises:  PT eval and D/C    AM-PAC 6 CLICK MOBILITY  Total Score:24     Patient left supine with call button in reach.    GOALS:   Multidisciplinary Problems     Physical Therapy Goals     Not on file                History:     Past Medical History:   Diagnosis Date    Thyroid disease        No past surgical history on file.    Time Tracking:     PT Received On:  04/04/20  PT Start Time: 1113     PT Stop Time: 1129  PT Total Time (min): 16 min     Billable Minutes: Evaluation 16 minutes      Hilda Carter, PT  04/04/2020

## 2020-04-04 NOTE — CARE UPDATE
04/04/20 1455   Home Oxygen Qualification   Room Air SpO2 At Rest 96 %   Room Air SpO2 on Exertion 93 %   SpO2 on Recovery 94 %   Recovery Heart Rate 110 bpm   Home O2 Eval Comments does not qualify for home o2

## 2020-04-05 VITALS
HEART RATE: 87 BPM | BODY MASS INDEX: 30.27 KG/M2 | DIASTOLIC BLOOD PRESSURE: 76 MMHG | TEMPERATURE: 97 F | OXYGEN SATURATION: 97 % | RESPIRATION RATE: 18 BRPM | SYSTOLIC BLOOD PRESSURE: 111 MMHG | WEIGHT: 204.38 LBS | HEIGHT: 69 IN

## 2020-04-05 PROBLEM — J96.01 ACUTE HYPOXEMIC RESPIRATORY FAILURE: Status: RESOLVED | Noted: 2020-04-05 | Resolved: 2020-04-05

## 2020-04-05 PROBLEM — R19.7 DIARRHEA: Status: RESOLVED | Noted: 2020-04-01 | Resolved: 2020-04-05

## 2020-04-05 PROBLEM — D72.829 LEUKOCYTOSIS: Status: ACTIVE | Noted: 2020-04-05

## 2020-04-05 PROBLEM — D72.829 LEUKOCYTOSIS: Status: RESOLVED | Noted: 2020-04-05 | Resolved: 2020-04-05

## 2020-04-05 PROBLEM — D75.839 THROMBOCYTOSIS: Status: ACTIVE | Noted: 2020-04-05

## 2020-04-05 PROBLEM — U07.1 PNEUMONIA DUE TO COVID-19 VIRUS: Status: RESOLVED | Noted: 2020-04-01 | Resolved: 2020-04-05

## 2020-04-05 PROBLEM — J96.01 ACUTE HYPOXEMIC RESPIRATORY FAILURE: Status: ACTIVE | Noted: 2020-04-05

## 2020-04-05 PROBLEM — J12.82 PNEUMONIA DUE TO COVID-19 VIRUS: Status: RESOLVED | Noted: 2020-04-01 | Resolved: 2020-04-05

## 2020-04-05 PROBLEM — E87.1 HYPONATREMIA: Status: RESOLVED | Noted: 2020-04-01 | Resolved: 2020-04-05

## 2020-04-05 PROBLEM — E87.6 HYPOKALEMIA: Status: RESOLVED | Noted: 2020-04-01 | Resolved: 2020-04-05

## 2020-04-05 LAB
ANION GAP SERPL CALC-SCNC: 11 MMOL/L (ref 8–16)
BACTERIA BLD CULT: NORMAL
BACTERIA BLD CULT: NORMAL
BASOPHILS # BLD AUTO: 0.07 K/UL (ref 0–0.2)
BASOPHILS NFR BLD: 0.8 % (ref 0–1.9)
BUN SERPL-MCNC: 17 MG/DL (ref 8–23)
CALCIUM SERPL-MCNC: 8.6 MG/DL (ref 8.7–10.5)
CHLORIDE SERPL-SCNC: 106 MMOL/L (ref 95–110)
CO2 SERPL-SCNC: 20 MMOL/L (ref 23–29)
CREAT SERPL-MCNC: 0.9 MG/DL (ref 0.5–1.4)
DIFFERENTIAL METHOD: ABNORMAL
DIGOXIN SERPL-MCNC: 0.2 NG/ML (ref 0.8–2)
EOSINOPHIL # BLD AUTO: 0.3 K/UL (ref 0–0.5)
EOSINOPHIL NFR BLD: 3 % (ref 0–8)
ERYTHROCYTE [DISTWIDTH] IN BLOOD BY AUTOMATED COUNT: 11.7 % (ref 11.5–14.5)
ERYTHROCYTE [DISTWIDTH] IN BLOOD BY AUTOMATED COUNT: 11.7 % (ref 11.5–14.5)
EST. GFR  (AFRICAN AMERICAN): >60 ML/MIN/1.73 M^2
EST. GFR  (NON AFRICAN AMERICAN): >60 ML/MIN/1.73 M^2
FERRITIN SERPL-MCNC: 1569 NG/ML (ref 20–300)
GLUCOSE SERPL-MCNC: 104 MG/DL (ref 70–110)
HCT VFR BLD AUTO: 40 % (ref 40–54)
HCT VFR BLD AUTO: 40 % (ref 40–54)
HGB BLD-MCNC: 14.2 G/DL (ref 14–18)
HGB BLD-MCNC: 14.2 G/DL (ref 14–18)
IMM GRANULOCYTES # BLD AUTO: 0.25 K/UL (ref 0–0.04)
IMM GRANULOCYTES NFR BLD AUTO: 2.7 % (ref 0–0.5)
LYMPHOCYTES # BLD AUTO: 2.1 K/UL (ref 1–4.8)
LYMPHOCYTES NFR BLD: 23 % (ref 18–48)
MAGNESIUM SERPL-MCNC: 2 MG/DL (ref 1.6–2.6)
MCH RBC QN AUTO: 32.1 PG (ref 27–31)
MCH RBC QN AUTO: 32.1 PG (ref 27–31)
MCHC RBC AUTO-ENTMCNC: 35.5 G/DL (ref 32–36)
MCHC RBC AUTO-ENTMCNC: 35.5 G/DL (ref 32–36)
MCV RBC AUTO: 90 FL (ref 82–98)
MCV RBC AUTO: 90 FL (ref 82–98)
MONOCYTES # BLD AUTO: 0.9 K/UL (ref 0.3–1)
MONOCYTES NFR BLD: 10 % (ref 4–15)
NEUTROPHILS # BLD AUTO: 5.5 K/UL (ref 1.8–7.7)
NEUTROPHILS NFR BLD: 60.5 % (ref 38–73)
NRBC BLD-RTO: 0 /100 WBC
PLATELET # BLD AUTO: 471 K/UL (ref 150–350)
PLATELET # BLD AUTO: 471 K/UL (ref 150–350)
PMV BLD AUTO: 9 FL (ref 9.2–12.9)
PMV BLD AUTO: 9 FL (ref 9.2–12.9)
POTASSIUM SERPL-SCNC: 3.7 MMOL/L (ref 3.5–5.1)
RBC # BLD AUTO: 4.43 M/UL (ref 4.6–6.2)
RBC # BLD AUTO: 4.43 M/UL (ref 4.6–6.2)
SODIUM SERPL-SCNC: 137 MMOL/L (ref 136–145)
WBC # BLD AUTO: 9.14 K/UL (ref 3.9–12.7)
WBC # BLD AUTO: 9.14 K/UL (ref 3.9–12.7)

## 2020-04-05 PROCEDURE — 85025 COMPLETE CBC W/AUTO DIFF WBC: CPT

## 2020-04-05 PROCEDURE — 83735 ASSAY OF MAGNESIUM: CPT

## 2020-04-05 PROCEDURE — 25000003 PHARM REV CODE 250: Performed by: NURSE PRACTITIONER

## 2020-04-05 PROCEDURE — 25000003 PHARM REV CODE 250: Performed by: INTERNAL MEDICINE

## 2020-04-05 PROCEDURE — 82728 ASSAY OF FERRITIN: CPT

## 2020-04-05 PROCEDURE — 80162 ASSAY OF DIGOXIN TOTAL: CPT

## 2020-04-05 PROCEDURE — 94761 N-INVAS EAR/PLS OXIMETRY MLT: CPT

## 2020-04-05 PROCEDURE — 63700000 PHARM REV CODE 250 ALT 637 W/O HCPCS: Performed by: INTERNAL MEDICINE

## 2020-04-05 PROCEDURE — 80048 BASIC METABOLIC PNL TOTAL CA: CPT

## 2020-04-05 PROCEDURE — 36415 COLL VENOUS BLD VENIPUNCTURE: CPT

## 2020-04-05 RX ORDER — DIGOXIN 125 MCG
0.12 TABLET ORAL DAILY
Qty: 30 TABLET | Refills: 0 | Status: SHIPPED | OUTPATIENT
Start: 2020-04-06 | End: 2020-05-06

## 2020-04-05 RX ORDER — METOPROLOL SUCCINATE 25 MG/1
25 TABLET, EXTENDED RELEASE ORAL DAILY
Qty: 30 TABLET | Refills: 0 | Status: SHIPPED | OUTPATIENT
Start: 2020-04-06 | End: 2020-05-06

## 2020-04-05 RX ORDER — HYDROXYCHLOROQUINE SULFATE 200 MG/1
400 TABLET, FILM COATED ORAL DAILY
Qty: 10 TABLET | Refills: 0 | Status: SHIPPED | OUTPATIENT
Start: 2020-04-06 | End: 2020-04-11

## 2020-04-05 RX ADMIN — POTASSIUM CHLORIDE 40 MEQ: 20 SOLUTION ORAL at 06:04

## 2020-04-05 RX ADMIN — CHLORHEXIDINE GLUCONATE 15 ML: 1.2 RINSE ORAL at 08:04

## 2020-04-05 RX ADMIN — HYDROXYCHLOROQUINE SULFATE 400 MG: 200 TABLET, FILM COATED ORAL at 08:04

## 2020-04-05 RX ADMIN — APIXABAN 5 MG: 5 TABLET, FILM COATED ORAL at 08:04

## 2020-04-05 RX ADMIN — MUPIROCIN: 20 OINTMENT TOPICAL at 08:04

## 2020-04-05 RX ADMIN — DIGOXIN 0.12 MG: 125 TABLET ORAL at 08:04

## 2020-04-05 RX ADMIN — LEVOTHYROXINE SODIUM 100 MCG: 100 TABLET ORAL at 06:04

## 2020-04-05 RX ADMIN — METOPROLOL SUCCINATE 25 MG: 25 TABLET, FILM COATED, EXTENDED RELEASE ORAL at 08:04

## 2020-04-05 NOTE — ASSESSMENT & PLAN NOTE
Patient is positive COVID 19.  To complete course of Plaquenil on discharge.  Patient and wife counseled about isolation precautions.  Strict return precautions explained.

## 2020-04-05 NOTE — ASSESSMENT & PLAN NOTE
Required 2 L supplemental oxygen which was slowly weaned.  Home O2 evaluation prior to discharge without need for continued therapy.

## 2020-04-05 NOTE — PROGRESS NOTES
[facility]  Cardiology  Progress Note    Patient Name: Rashawn Dai  MRN: [id number]  Admission Date: 3/31/2020  Hospital Length of Stay: 5 days  Code Status: Full Code   Attending Physician: Rekha Torres MD   Primary Care Physician: Silver Rodriguez MD  Expected Discharge Date:   Principal Problem:COVID-19 virus infection    Subjective:     Hospital Course:  Admitted to hospital on 03/31/2020 is with COVID-19.  04/01/2020 moved out of ICU on to cardiology B floor      Interval History: Per nurse report, patient is feeling better. Labs and vitals were stable overnight.     ROS:    Per hospitalist and nurse report:    +dyspnea on exertion  + generalized weakness   Objective:     Vital Signs (Most Recent):  Temp: 96.9 °F (36.1 °C) (04/05/20 1210)  Pulse: 75 (04/05/20 1210)  Resp: 20 (04/05/20 1210)  BP: 111/76 (04/05/20 1210)  SpO2: 96 % (04/05/20 1210) Vital Signs (24h Range):  Temp:  [96.9 °F (36.1 °C)-99.5 °F (37.5 °C)] 96.9 °F (36.1 °C)  Pulse:  [75-99] 75  Resp:  [16-20] 20  SpO2:  [94 %-96 %] 96 %  BP: (107-112)/(69-76) 111/76     Weight: 92.7 kg (204 lb 5.9 oz)  Body mass index is 30.18 kg/m².    SpO2: 96 %  O2 Device (Oxygen Therapy): room air      Intake/Output Summary (Last 24 hours) at 4/5/2020 1452  Last data filed at 4/4/2020 1600  Gross per 24 hour   Intake 50 ml   Output --   Net 50 ml       Lines/Drains/Airways     Peripheral Intravenous Line                 Peripheral IV - Single Lumen 04/04/20 1428 22 G Anterior;Left Forearm 1 day                Physical Exam     As per Hospitalist:    Constitutional: No distress.  Looks sick  HENT: NC, with supplemental oxygen at 3 liters/minute and O2 sat of 96% this morning   Head: Atraumatic.   Cardiovascular: Irregularly irregular  Pulmonary/Chest:  bilateral basal crepitations, occasional expiratory wheezes  Abdominal: Soft. Bowel sounds are normal. No distension and no mass. No tenderness  Neurological: Alert.   Skin: Skin is warm and  dry.     Significant Labs:   CMP   Recent Labs   Lab 04/04/20  0446 04/05/20  0442   * 137   K 4.0 3.7    106   CO2 22* 20*   GLU 85 104   BUN 17 17   CREATININE 0.9 0.9   CALCIUM 8.5* 8.6*   ANIONGAP 10 11   ESTGFRAFRICA >60.0 >60.0   EGFRNONAA >60.0 >60.0   , CBC   Recent Labs   Lab 04/04/20  0446 04/05/20  0442   WBC 9.99 9.14  9.14   HGB 15.5 14.2  14.2   HCT 43.0 40.0  40.0   * 471*  471*    and Troponin   No results for input(s): TROPONINI in the last 48 hours.    Significant Imaging:   BNP  Recent Labs   Lab 04/02/20  0502   *     Assessment and Plan:  COVID-19 associated bilateral pneumonia.  Persistent atrial fibrillation    Plan:    1. Continue Eliquis 5 mg po BID.   2. DC home soon with metoprolol succinate 25 mg po daily and digoxin 125 mg po daily.   3. Check digoxin level today. May use blood from AM labs.   4. If patient remains in hospital, Dr. Orlando will resume care tomorrow.        Brief HPI:     Active Diagnoses:    Diagnosis Date Noted POA    PRINCIPAL PROBLEM:  COVID-19 virus infection [U07.1] 03/31/2020 Yes    New onset Atrial fibrillation with RVR [I48.91] 04/01/2020 Yes    Hypokalemia [E87.6] 04/01/2020 Yes    Hyponatremia [E87.1] 04/01/2020 Yes    Transaminitis [R74.0] 04/01/2020 Yes    Diarrhea [R19.7] 04/01/2020 Yes    Pneumonia due to COVID-19 virus [U07.1, J12.89] 04/01/2020 Yes    Atypical pneumonia [J18.9] 03/31/2020 Yes    Shortness of breath [R06.02] 03/31/2020 Yes      Problems Resolved During this Admission:       VTE Risk Mitigation (From admission, onward)         Ordered     apixaban tablet 5 mg  2 times daily      04/04/20 1145     IP VTE HIGH RISK PATIENT  Once      03/31/20 1506                Lanny Humphrey, BALDEV  Cardiology  Counts include 234 beds at the [facility]

## 2020-04-05 NOTE — PLAN OF CARE
04/05/20 1527   Discharge Reassessment   Assessment Type Final Discharge Note   Anticipated Discharge Disposition Home

## 2020-04-05 NOTE — HOSPITAL COURSE
I, Dr Torres, assumed care of this patient on 4/5/20. Patient was admitted on 3/31 with shortness of breath associated with cough and fever.  Also admitted to diarrhea and generalized fatigue.  On admission was noted to be hypoxic which improved with supplemental O2.  Afebrile.  High suspicion for COVID-19 and testing was sent, returned positive.  Patient was started on empiric antibiotics, Rocephin, azithromycin as well as Plaquenil.  Inflammatory markers were elevated including ferritin and CRP.  Also found to be in atrial fibrillation with rapid ventricular response, new onset, admitted to telemetry for with cardiology consult.  He was started on therapeutic dose of Lovenox, medications titrated to metoprolol succinate 25 mg and digoxin, anticoagulation with Eliquis 5 mg b.i.d..  He was slow to respond.  Shortness of breath and generalized weakness slowly improved.  Home O2 evaluation was performed prior to discharge.  T-max 24 hr prior to discharge 99.5.  Discussed with Infectious Disease, cuauhtemoc, on day of discharge, would recommend continuing Plaquenil 400 mg daily for 5 more days.  Discussed with Cardiology, cleared for discharge on Eliquis, given persistent atrial fibrillation, metoprolol and digoxin with outpatient follow-up in 2-3 weeks time, once respiratory issues more settled will determine if need for BERYL/cardioversion versus continued medical management.  Discussed with patient and wife plan of care, discharge plan, precautions including self quarantining.  All questions and concerns addressed.    Discharge examination  Lying in bed, no apparent distress, cooperative  Not on supplemental oxygen, no wheeze appreciated  Irregular heart rhythm, heart rates in the 80s, no significant lower extremity edema  Abdomen soft and nontender

## 2020-04-05 NOTE — ASSESSMENT & PLAN NOTE
Patient with new onset atrial fibrillation with RVR.  Cardiology consulted.  Suspect driven by respiratory process, low chads Vasc score however given persistent atrial fibrillation did recommend anticoagulation with Eliquis 5 mg b.i.d..  Other cardiac medications include metoprolol 25 and digoxin 125mcg with outpatient follow-up.

## 2020-04-05 NOTE — DISCHARGE SUMMARY
[facility] Medicine  Discharge Summary      Patient Name: Rashawn Dai  MRN: [id number]  Admission Date: 3/31/2020  Hospital Length of Stay: 5 days  Discharge Date and Time:  04/05/2020 3:21 PM  Attending Physician: Rekha Torres MD   Discharging Provider: Rekha Torres MD  Primary Care Provider: Silver Rodriguez MD      HPI:   History was obtained from the patient and ER physician Sign-out. Patient presented with headache, cough, shortness of breath and fever x 9 days. His symptoms are progressively getting worst. Other associated symptoms include diarrhea and fatigue. Denies any alleviating or worsening factors. He is a  and reports he has come into contact with coworkers who tested positive for COVID 19.  Patient was seen at outside clinic a few days ago and treated with azithromycin with no improvement. Patient decided to come to the ER for further evaluation today after becoming SOB.     In the emergency room, patient was saturating in low 90s on room air. He required 2L oxygen.  He is febrile. Patient CBC was unremarkable. CMP was unremarkable. CRP is pending. BNP and troponin was negative. Reviewed EKG which shows tachycardia 110s. HR progressed to 130s while in the ED. No QTC prolongation. He is given 500cc NS. CXR shows bilateral infiltratres consistent with COVID 19.     Decision to admit was taken and patient was informed about the plan of care.         * No surgery found *      Hospital Course:   I, Dr Torres, assumed care of this patient on 4/5/20. Patient was admitted on 3/31 with shortness of breath associated with cough and fever.  Also admitted to diarrhea and generalized fatigue.  On admission was noted to be hypoxic which improved with supplemental O2.  Afebrile.  High suspicion for COVID-19 and testing was sent, returned positive.  Patient was started on empiric antibiotics, Rocephin, azithromycin as well as Plaquenil.  Inflammatory markers were elevated  including ferritin and CRP.  Also found to be in atrial fibrillation with rapid ventricular response, new onset, admitted to telemetry for with cardiology consult.  He was started on therapeutic dose of Lovenox, medications titrated to metoprolol succinate 25 mg and digoxin, anticoagulation with Eliquis 5 mg b.i.d..  He was slow to respond.  Shortness of breath and generalized weakness slowly improved.  Home O2 evaluation was performed prior to discharge.  T-max 24 hr prior to discharge 99.5.  Discussed with Infectious Disease, cuauhtemoc, on day of discharge, would recommend continuing Plaquenil 400 mg daily for 5 more days.  Discussed with Cardiology, cleared for discharge on Eliquis, given persistent atrial fibrillation, metoprolol and digoxin with outpatient follow-up in 2-3 weeks time, once respiratory issues more settled will determine if need for BERYL/cardioversion versus continued medical management.  Discussed with patient and wife plan of care, discharge plan, precautions including self quarantining.  All questions and concerns addressed.    Discharge examination  Lying in bed, no apparent distress, cooperative  Not on supplemental oxygen, no wheeze appreciated  Irregular heart rhythm, heart rates in the 80s, no significant lower extremity edema  Abdomen soft and nontender     Consults:   Consults (From admission, onward)        Status Ordering Provider     Inpatient consult to Cardiology  Once     Provider:  Avila Orlando MD    Completed SHARITA MURPHY     Inpatient consult to Hospitalist  Once     Provider:  Marisol Mcguire MD    Acknowledged SHARITA MURPHY          * Acute hypoxemic respiratory failure  Required 2 L supplemental oxygen which was slowly weaned.  Home O2 evaluation prior to discharge without need for continued therapy.      Thrombocytosis        Pneumonia due to COVID-19 virus  Completed course of antibiotics.  Clinically improved.      New onset Atrial fibrillation with RVR  Patient  with new onset atrial fibrillation with RVR.  Cardiology consulted.  Suspect driven by respiratory process, low chads Vasc score however given persistent atrial fibrillation did recommend anticoagulation with Eliquis 5 mg b.i.d..  Other cardiac medications include metoprolol 25 and digoxin 125mcg with outpatient follow-up.      COVID-19 virus infection  Patient is positive COVID 19.  To complete course of Plaquenil on discharge.  Patient and wife counseled about isolation precautions.  Strict return precautions explained.      Final Active Diagnoses:    Diagnosis Date Noted POA    PRINCIPAL PROBLEM:  Acute hypoxemic respiratory failure [J96.01] 04/05/2020 Yes    Thrombocytosis [D47.3] 04/05/2020 No    New onset Atrial fibrillation with RVR [I48.91] 04/01/2020 Yes    Pneumonia due to COVID-19 virus [U07.1, J12.89] 04/01/2020 Yes    COVID-19 virus infection [U07.1] 03/31/2020 Yes      Problems Resolved During this Admission:    Diagnosis Date Noted Date Resolved POA    Leukocytosis [D72.829] 04/05/2020 04/05/2020 Yes    Hypokalemia [E87.6] 04/01/2020 04/05/2020 Yes    Hyponatremia [E87.1] 04/01/2020 04/05/2020 Yes    Diarrhea [R19.7] 04/01/2020 04/05/2020 Yes       Discharged Condition: good    Disposition: Home or Self Care    Follow Up:  Follow-up Information     Avila Orlando MD. Schedule an appointment as soon as possible for a visit in 2 weeks.    Specialty:  Cardiology  Why:  atrial fibrillation   Contact information:  [address]  [address]  [place]OLOG[place] [facility] [address]  [phone]             Schedule an appointment as soon as possible for a visit with Silver Rodriguez MD.    Specialty:  Pulmonary Disease  Why:  As needed- post hospital follow up  Contact information:  [facility]  [place] [id number] [phone]                 Patient Instructions:      Diet Cardiac     Other restrictions (specify):   Order Comments: Patient to self quarantine due to positive covid 19      Notify your health care provider if you experience any of the following:  temperature >100.4     Notify your health care provider if you experience any of the following:  difficulty breathing or increased cough       Significant Diagnostic Studies: Labs:   BMP:   Recent Labs   Lab 04/04/20  0446 04/05/20  0442   GLU 85 104   * 137   K 4.0 3.7    106   CO2 22* 20*   BUN 17 17   CREATININE 0.9 0.9   CALCIUM 8.5* 8.6*   MG 2.2  2.2 2.0   , CMP   Recent Labs   Lab 04/04/20  0446 04/05/20  0442   * 137   K 4.0 3.7    106   CO2 22* 20*   GLU 85 104   BUN 17 17   CREATININE 0.9 0.9   CALCIUM 8.5* 8.6*   ANIONGAP 10 11   ESTGFRAFRICA >60.0 >60.0   EGFRNONAA >60.0 >60.0   , CBC   Recent Labs   Lab 04/04/20 0446 04/05/20 0442   WBC 9.99 9.14  9.14   HGB 15.5 14.2  14.2   HCT 43.0 40.0  40.0   * 471*  471*   , INR   Lab Results   Component Value Date    INR 1.1 03/31/2020   , Lipid Panel   Lab Results   Component Value Date    CHOL 219 (H) 06/05/2018    HDL 52 06/05/2018    LDLCALC 147.2 06/05/2018    TRIG 99 06/05/2018    CHOLHDL 23.7 06/05/2018   , Troponin   Recent Labs   Lab 04/02/20  0502   TROPONINI <0.030   , A1C: No results for input(s): HGBA1C in the last 4320 hours. and All labs within the past 24 hours have been reviewed    Pending Diagnostic Studies:     Procedure Component Value Units Date/Time    Digoxin level [[id number]]     Order Status:  Sent Lab Status:  No result     Specimen:  Blood     EKG 12-lead [[id number]] Collected:  04/04/20 0551    Order Status:  Sent Lab Status:  In process Updated:  04/04/20 1615    Narrative:       Test Reason : I48.91,    Vent. Rate : 091 BPM     Atrial Rate : 054 BPM     P-R Int : 000 ms          QRS Dur : 084 ms      QT Int : 356 ms       P-R-T Axes : 000 013 098 degrees     QTc Int : 437 ms    Atrial fibrillation  Nonspecific ST and T wave abnormality  Abnormal ECG  When compared with ECG of 03-APR-2020 06:13,  Nonspecific T wave  abnormality no longer evident in Inferior leads    Referred By: [email or web address]   SELF           Confirmed By:        X-ray Chest Ap Portable    Result Date: 4/2/2020  EXAMINATION: XR CHEST AP PORTABLE CLINICAL HISTORY: sob; COMPARISON: 03/31/2020 FINDINGS: Low lung volumes with stable cardiac silhouette size.  Increased interstitial markings are evident bilaterally with superimposed patchy ground-glass and alveolar densities, mid and lower lung zone predominant.  Overall, findings are slightly worsened compared to prior.  No pleural effusion or pneumothorax.  No acute osseous abnormality.     Interstitial and ground-glass opacities bilaterally, typical of COVID pneumonia, minimally worsened compared to prior. Electronically signed by: Trey Rivas MD Date:    04/02/2020 Time:    11:29    X-ray Chest Ap Portable    Result Date: 3/31/2020  CLINICAL HISTORY: ([id number])63 y/o  (1955) M Shortness of breath TECHNIQUE: (A#[id number], exam time 3/31/2020 11:06) XR CHEST AP PORTABLE [id number] COMPARISON: Radiograph most recently from 09/22/2015 FINDINGS: Patchy airspace opacity in the right mid to lower lung zone and interstitial opacities in the left mid to lower lung zone (ground-glass).  Costophrenic angles are seen without effusion. No pneumothorax is identified. The heart is top normal in size.  Osseous structures appear within normal limits. The visualized upper abdomen is unremarkable.     Right greater than left, mixed interstitial and alveolar opacities at the lung bases suggesting multifocal pneumonia (and less likely edema, ARDS or aspiration).  Consider radiographic follow-up to document resolution. RESULT NOTIFICATION: These observations were discussed by the dictating physician, by phone with, and acknowledged by Javy Talavera at 11:09 on 3/31/2020. Electronically signed by: Jax Cook MD Date:    03/31/2020 Time:    11:12    Medications:  Reconciled Home Medications:      Medication List      START  taking these medications    apixaban 5 mg Tab  Commonly known as:  ELIQUIS  Take 1 tablet (5 mg total) by mouth 2 (two) times daily.     digoxin 125 mcg tablet  Commonly known as:  LANOXIN  Take 1 tablet (0.125 mg total) by mouth once daily.  Start taking on:  April 6, 2020     hydroxychloroquine 200 mg tablet  Commonly known as:  PLAQUENIL  Take 2 tablets (400 mg total) by mouth once daily. for 5 days  Start taking on:  April 6, 2020     metoprolol succinate 25 MG 24 hr tablet  Commonly known as:  TOPROL-XL  Take 1 tablet (25 mg total) by mouth once daily.  Start taking on:  April 6, 2020        CHANGE how you take these medications    SYNTHROID 100 MCG tablet  Generic drug:  levothyroxine  TAKE 1 TABLET EVERY DAY  What changed:    · how much to take  · when to take this        CONTINUE taking these medications    benzonatate 100 MG capsule  Commonly known as:  TESSALON  Take 100 mg by mouth 3 (three) times daily as needed for Cough.        STOP taking these medications    sildenafiL 100 MG tablet  Commonly known as:  VIAGRA     ZITHROMAX Z-DEANGELO 250 MG tablet  Generic drug:  azithromycin            Indwelling Lines/Drains at time of discharge:   Lines/Drains/Airways     None                 Time spent on the discharge of patient: 37 minutes  Patient was seen and examined on the date of discharge and determined to be suitable for discharge.         Rekha Torres MD  Department of Hospital Medicine  [facility]

## 2020-04-13 ENCOUNTER — PATIENT OUTREACH (OUTPATIENT)
Dept: ORTHOPEDICS | Facility: CLINIC | Age: 65
End: 2020-04-13

## 2020-04-13 DIAGNOSIS — U07.1 COVID-19 VIRUS DETECTED: ICD-10-CM

## 2020-04-22 NOTE — PROGRESS NOTES
Initial Outpatient COVID-19 Patient Outreach    Patient: Rashawn Dai  MRN: [id number]  Date of Service: 4/22/2020  Completed by: Kervin Chavez    Brief Summary: Outreach call completed regarding positive COIVD-19 test result. Spoke to patient     Assessment Documentation     COVID-19 Assessment    Nurse Assessment via Chart Review:   Date of positive test :3/31/2020   Was patient Hospitalized? Yes    If so, how many days? 5    Was patient on a ventilator? No    If so, how many days?    Nurse Assessment with Patient:    Are you currently experiencing any worsening symptoms? No  Fever:    Last time your temp was taken?   What was your last temp reading?   Are you taking any medications to reduce your fever? No   If yes, what medication?   When was the last time you took a fever reducing medication?  Coughing:    Are you taking any medications to treat your cough? No   If yes, what medication?   On a scale of 1-10 how bad is your cough?  Difficulty Breathing:    Are you taking any medications to help with your breathing?  No          If yes, which medications are you taking?         On a scale of 1-10 how difficult is it to breathe normally?        Any medical equipment in use? No       Oxygen? Yes    Nebulizer? No        Other? No       On a normal day, what is your energy level? Fatigued      What is your current energy level? Moderate to high, almost back to normal but does not affect ADL's.    Psycho/Social Assessment:       Do you have a support person/caregiver?  Yes     Are you able to isolate yourself from other family members? Yes           Is anyone else in your home ill with COVID-19? No          Were you working prior to the COVID-19 illness? Yes           Are you still employed? Yes          Are you able to work from home? No    Instructions Given:     F/u PCP PRN. Feeling much better. Almost back to normal. Occasional dry cough, but no meds and no fever.     Patient verbalized understanding of today's  instructions. Encouraged patient/caregiver to communicate with his/her physician and health care team about health conditions or concerns. Encouraged patient/caregiver to call or message PCP via [facility] with any questions as needed.

## 2020-05-05 ENCOUNTER — HOSPITAL ENCOUNTER (OUTPATIENT)
Dept: RADIOLOGY | Facility: HOSPITAL | Age: 65
Discharge: HOME | End: 2020-05-05
Attending: INTERNAL MEDICINE
Payer: COMMERCIAL

## 2020-05-05 DIAGNOSIS — J18.9 UNRESOLVED PNEUMONIA: Primary | ICD-10-CM

## 2020-05-05 DIAGNOSIS — J18.9 UNRESOLVED PNEUMONIA: ICD-10-CM

## 2020-05-05 PROCEDURE — 71046 X-RAY EXAM CHEST 2 VIEWS: CPT | Mod: TC

## 2020-05-18 ENCOUNTER — TELEPHONE (OUTPATIENT)
Dept: PULMONOLOGY | Facility: CLINIC | Age: 65
End: 2020-05-18

## 2020-05-18 ENCOUNTER — CLINICAL SUPPORT (OUTPATIENT)
Dept: URGENT CARE | Facility: CLINIC | Age: 65
End: 2020-05-18
Payer: COMMERCIAL

## 2020-05-18 DIAGNOSIS — U07.1 COVID-19 VIRUS INFECTION: Primary | ICD-10-CM

## 2020-05-18 DIAGNOSIS — U07.1 COVID-19 VIRUS INFECTION: ICD-10-CM

## 2020-05-18 PROCEDURE — U0003 INFECTIOUS AGENT DETECTION BY NUCLEIC ACID (DNA OR RNA); SEVERE ACUTE RESPIRATORY SYNDROME CORONAVIRUS 2 (SARS-COV-2) (CORONAVIRUS DISEASE [COVID-19]), AMPLIFIED PROBE TECHNIQUE, MAKING USE OF HIGH THROUGHPUT TECHNOLOGIES AS DESCRIBED BY CMS-2020-01-R: HCPCS

## 2020-05-18 NOTE — TELEPHONE ENCOUNTER
Mr Dai said he was hospitalized 3-31-20 at [facility] with Covid 19. He was in the hospital for 6 days and was told to follow up with Dr Rodriguez who he sees through [facility]. He will do covid testing today at [facility] and see Dr Rodriguez 5-20-20. Patient was happy with this appointment. Oliva Naylor LPN

## 2020-05-18 NOTE — TELEPHONE ENCOUNTER
----- Message from Mya Patel sent at 5/18/2020  1:19 PM CDT -----  Pt called to schedule a COVID 19 F/U appt and asked for a call back with appt.      Pt contact # [phone].      Thanks

## 2020-05-19 LAB — SARS-COV-2 RNA RESP QL NAA+PROBE: NOT DETECTED

## 2020-05-20 ENCOUNTER — OFFICE VISIT (OUTPATIENT)
Dept: PULMONOLOGY | Facility: CLINIC | Age: 65
End: 2020-05-20
Payer: COMMERCIAL

## 2020-05-20 VITALS
SYSTOLIC BLOOD PRESSURE: 128 MMHG | WEIGHT: 199.94 LBS | DIASTOLIC BLOOD PRESSURE: 72 MMHG | HEIGHT: 69 IN | OXYGEN SATURATION: 100 % | BODY MASS INDEX: 29.61 KG/M2 | HEART RATE: 82 BPM

## 2020-05-20 DIAGNOSIS — U07.1 COVID-19 VIRUS INFECTION: Primary | ICD-10-CM

## 2020-05-20 DIAGNOSIS — J12.82 PNEUMONIA DUE TO COVID-19 VIRUS: Primary | ICD-10-CM

## 2020-05-20 DIAGNOSIS — U07.1 PNEUMONIA DUE TO COVID-19 VIRUS: Primary | ICD-10-CM

## 2020-05-20 PROCEDURE — 99999 PR PBB SHADOW E&M-EST. PATIENT-LVL III: CPT | Mod: PBBFAC,,, | Performed by: INTERNAL MEDICINE

## 2020-05-20 PROCEDURE — 99214 PR OFFICE/OUTPT VISIT, EST, LEVL IV, 30-39 MIN: ICD-10-PCS | Mod: S$GLB,,, | Performed by: INTERNAL MEDICINE

## 2020-05-20 PROCEDURE — 99999 PR PBB SHADOW E&M-EST. PATIENT-LVL III: ICD-10-PCS | Mod: PBBFAC,,, | Performed by: INTERNAL MEDICINE

## 2020-05-20 PROCEDURE — 3008F BODY MASS INDEX DOCD: CPT | Mod: CPTII,S$GLB,, | Performed by: INTERNAL MEDICINE

## 2020-05-20 PROCEDURE — 3008F PR BODY MASS INDEX (BMI) DOCUMENTED: ICD-10-PCS | Mod: CPTII,S$GLB,, | Performed by: INTERNAL MEDICINE

## 2020-05-20 PROCEDURE — 99214 OFFICE O/P EST MOD 30 MIN: CPT | Mod: S$GLB,,, | Performed by: INTERNAL MEDICINE

## 2020-05-20 NOTE — LETTER
----- Message from Jaqueline Mcgee sent at 2017  8:26 AM CDT -----  Contact: self  Sil Giraldo  MRN: [id number]  : 1947  PCP: Tess Morel  Home Phone      [phone]  Work Phone      Not on file.  Mobile          [phone]      MESSAGE:   Pt said that she had a rash that broke out on her arm earlier in the week. This morning she woke up and it is on the side of her face. She is wanting to see if she can get something called in for it. She said that it is itching a lot. Would another provider be able to send something since Dr. Morel won't be in until Monday?    Pharmacy: [facility] [place]    Phone: [phone]   May 21, 2020    Rashawn Dai  [address]  [facility] [place] [facility] [address]             [facility]  [address]  [place] [address]  Phone: [phone] Dear  Rene,       This is a summary of your recent follow up visit after being hospitalized at [facility] for Covid pneumonia with respiratory failure and acute onset of atrial fibrillation.  All is well today.      If you have any questions or concerns, please don't hesitate to call.    Sincerely,        Jax Rodriguez MD

## 2020-05-21 NOTE — PROGRESS NOTES
Subjective:      Patient ID: Rashawn Dai is a 64 y.o. male.    Chief Complaint: No chief complaint on file.    HPI  Follow up on Covid pneumonia that required hospitalization at [facility] 3/31 to 4/05 H works as a  at [employer].  He began with bad CI symptoms and in 48 hours had progressive shortness of breath. He was hypoxic and they was debate among the attending about intubation but he began to respond to fluid resuscitation and oxygen and was not intubated. He is feeling fine today but admits that his stamina is not what he would like, but he is in no distress. 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 He developed atrial fibrillation in the acute phase of the illness and is now in NSR on metoprolol 25 mg XL.. Will continue to the end of the month and then stop.   Plans to go to [place] for this upcoming [facility] Day Week End.     Chest x-ray taken 5/5 shows complete resolution of the pneumonia on film of 4/2  Resting Sa02: 98%  Pulse: 68  After walking the length of the candelaria and back briskly: 97% and pulse 99.    Antibody test done  Today is positive.    IMP: Resolved Covid pneumonia  With Respiratory Failure.   No flowsheet data found.  Review of Systems   Constitutional: Negative.    HENT: Negative.    Eyes: Negative.    Respiratory: Negative.         Respiratory failure and hospitalization 3/31-4/5   Cardiovascular: Negative.         Atrial fibrillation with onset of Covid pneumonia   Genitourinary: Negative.    Musculoskeletal: Negative.    Skin: Negative.    Gastrointestinal: Positive for nausea and vomiting.        Bad GI symptoms as a presenting symptom with Covid infection 3/29   Neurological: Negative.    Psychiatric/Behavioral: Negative.      Objective:     Physical Exam   Constitutional: He is oriented to person, place, and time. He appears well-developed and well-nourished.   HENT:   Head: Normocephalic and  atraumatic.   Right Ear: External ear normal.   Left Ear: External ear normal.   Eyes: Pupils are equal, round, and reactive to light. Conjunctivae and EOM are normal.   Neck: Normal range of motion. Neck supple.   Cardiovascular: Normal rate, regular rhythm and normal heart sounds.   Pulmonary/Chest: Effort normal and breath sounds normal.   Abdominal: Soft. Bowel sounds are normal.   Musculoskeletal: Normal range of motion.   Neurological: He is alert and oriented to person, place, and time. He has normal reflexes.   Skin: Skin is warm and dry.   Psychiatric: He has a normal mood and affect. His behavior is normal. Judgment and thought content normal.       Assessment:     1. Pneumonia due to COVID-19 virus      Outpatient Encounter Medications as of 5/20/2020   Medication Sig Dispense Refill    benzonatate (TESSALON) 100 MG capsule Take 100 mg by mouth 3 (three) times daily as needed for Cough.      digoxin (LANOXIN) 125 mcg tablet Take 1 tablet (0.125 mg total) by mouth once daily. 30 tablet 0    metoprolol succinate (TOPROL-XL) 25 MG 24 hr tablet Take 1 tablet (25 mg total) by mouth once daily. 30 tablet 0    SYNTHROID 100 mcg tablet TAKE 1 TABLET EVERY DAY (Patient taking differently: Take 100 mcg by mouth before breakfast. ) 30 tablet 10     No facility-administered encounter medications on file as of 5/20/2020.        Plan:   Recovery from Covid infection with antibodies present today. Continue metoprolol until the end of the month  Problem List Items Addressed This Visit     None      Visit Diagnoses     Pneumonia due to COVID-19 virus    -  Primary

## 2020-08-31 ENCOUNTER — TELEPHONE (OUTPATIENT)
Dept: PULMONOLOGY | Facility: CLINIC | Age: 65
End: 2020-08-31

## 2020-08-31 RX ORDER — FLUTICASONE PROPIONATE 50 MCG
2 SPRAY, SUSPENSION (ML) NASAL DAILY
COMMUNITY
Start: 2020-08-13

## 2020-08-31 NOTE — TELEPHONE ENCOUNTER
----- Message from Kourtney Wiggins sent at 8/31/2020  4:06 PM CDT -----  Type:  RX Refill Request    Who Called:  pt     RX Name and Streng viagra     How is the patient currently taking it? (ex. 1XDay):  as needed    Is this a 30 day or 90 day RX:  30    Preferred Pharmacy with phone number: [facility]/pharmacy #[id number] - [place] [id number] [facility] [phone] (Phone)     Local or Mail Order:  local     Ordering Provider:  DR Sheppard     Would the patient rather a call back or a response via [facility]?  Call     Best Call Back Number:  [phone]    Additional Information:  refil

## 2021-01-07 ENCOUNTER — CLINICAL SUPPORT (OUTPATIENT)
Dept: URGENT CARE | Facility: CLINIC | Age: 66
End: 2021-01-07
Payer: COMMERCIAL

## 2021-01-07 DIAGNOSIS — Z11.9 SCREENING EXAMINATION FOR UNSPECIFIED INFECTIOUS DISEASE: Primary | ICD-10-CM

## 2021-01-07 LAB
CTP QC/QA: YES
SARS-COV-2 RDRP RESP QL NAA+PROBE: NEGATIVE

## 2021-01-07 PROCEDURE — 99211 PR OFFICE/OUTPT VISIT, EST, LEVL I: ICD-10-PCS | Mod: S$GLB,,, | Performed by: FAMILY MEDICINE

## 2021-01-07 PROCEDURE — U0002: ICD-10-PCS | Mod: QW,S$GLB,, | Performed by: FAMILY MEDICINE

## 2021-01-07 PROCEDURE — U0002 COVID-19 LAB TEST NON-CDC: HCPCS | Mod: QW,S$GLB,, | Performed by: FAMILY MEDICINE

## 2021-01-07 PROCEDURE — 99211 OFF/OP EST MAY X REQ PHY/QHP: CPT | Mod: S$GLB,,, | Performed by: FAMILY MEDICINE

## 2021-01-26 ENCOUNTER — PATIENT MESSAGE (OUTPATIENT)
Dept: ADMINISTRATIVE | Facility: CLINIC | Age: 66
End: 2021-01-26

## 2021-02-26 ENCOUNTER — PATIENT MESSAGE (OUTPATIENT)
Dept: PULMONOLOGY | Facility: CLINIC | Age: 66
End: 2021-02-26

## 2021-03-18 ENCOUNTER — TELEPHONE (OUTPATIENT)
Dept: CARDIOLOGY | Facility: CLINIC | Age: 66
End: 2021-03-18

## 2021-04-26 ENCOUNTER — PATIENT MESSAGE (OUTPATIENT)
Dept: RESEARCH | Facility: HOSPITAL | Age: 66
End: 2021-04-26

## 2021-07-14 ENCOUNTER — TELEPHONE (OUTPATIENT)
Dept: PULMONOLOGY | Facility: CLINIC | Age: 66
End: 2021-07-14

## 2021-07-15 ENCOUNTER — TELEPHONE (OUTPATIENT)
Dept: PULMONOLOGY | Facility: CLINIC | Age: 66
End: 2021-07-15

## 2021-07-20 ENCOUNTER — HOSPITAL ENCOUNTER (OUTPATIENT)
Dept: RADIOLOGY | Facility: HOSPITAL | Age: 66
Discharge: HOME | End: 2021-07-20
Attending: INTERNAL MEDICINE
Payer: COMMERCIAL

## 2021-07-20 ENCOUNTER — CLINICAL SUPPORT (OUTPATIENT)
Dept: INTERNAL MEDICINE | Facility: CLINIC | Age: 66
End: 2021-07-20
Payer: COMMERCIAL

## 2021-07-20 ENCOUNTER — OFFICE VISIT (OUTPATIENT)
Dept: PULMONOLOGY | Facility: CLINIC | Age: 66
End: 2021-07-20
Payer: COMMERCIAL

## 2021-07-20 ENCOUNTER — HOSPITAL ENCOUNTER (OUTPATIENT)
Dept: CARDIOLOGY | Facility: CLINIC | Age: 66
Discharge: HOME | End: 2021-07-20
Payer: COMMERCIAL

## 2021-07-20 VITALS
HEIGHT: 69 IN | BODY MASS INDEX: 31.84 KG/M2 | SYSTOLIC BLOOD PRESSURE: 112 MMHG | DIASTOLIC BLOOD PRESSURE: 66 MMHG | WEIGHT: 214.94 LBS

## 2021-07-20 DIAGNOSIS — R20.2 BILATERAL NUMBNESS AND TINGLING OF ARMS AND LEGS: ICD-10-CM

## 2021-07-20 DIAGNOSIS — R20.0 BILATERAL NUMBNESS AND TINGLING OF ARMS AND LEGS: ICD-10-CM

## 2021-07-20 DIAGNOSIS — U07.1 COVID-19 VIRUS INFECTION: ICD-10-CM

## 2021-07-20 DIAGNOSIS — Z00.00 ANNUAL PHYSICAL EXAM: Primary | ICD-10-CM

## 2021-07-20 DIAGNOSIS — U07.1 COVID-19 VIRUS INFECTION: Primary | ICD-10-CM

## 2021-07-20 DIAGNOSIS — Z00.00 ROUTINE GENERAL MEDICAL EXAMINATION AT A HEALTH CARE FACILITY: Primary | ICD-10-CM

## 2021-07-20 LAB
ALBUMIN SERPL BCP-MCNC: 4.4 G/DL (ref 3.5–5.2)
ALP SERPL-CCNC: 65 U/L (ref 55–135)
ALT SERPL W/O P-5'-P-CCNC: 68 U/L (ref 10–44)
ANION GAP SERPL CALC-SCNC: 11 MMOL/L (ref 8–16)
AST SERPL-CCNC: 49 U/L (ref 10–40)
BILIRUB SERPL-MCNC: 1.3 MG/DL (ref 0.1–1)
BUN SERPL-MCNC: 15 MG/DL (ref 8–23)
CALCIUM SERPL-MCNC: 9.3 MG/DL (ref 8.7–10.5)
CHLORIDE SERPL-SCNC: 102 MMOL/L (ref 95–110)
CHOLEST SERPL-MCNC: 263 MG/DL (ref 120–199)
CHOLEST/HDLC SERPL: 4.6 {RATIO} (ref 2–5)
CO2 SERPL-SCNC: 25 MMOL/L (ref 23–29)
COMPLEXED PSA SERPL-MCNC: 0.8 NG/ML (ref 0–4)
CREAT SERPL-MCNC: 1 MG/DL (ref 0.5–1.4)
ERYTHROCYTE [DISTWIDTH] IN BLOOD BY AUTOMATED COUNT: 12.6 % (ref 11.5–14.5)
ERYTHROCYTE [SEDIMENTATION RATE] IN BLOOD BY WESTERGREN METHOD: 5 MM/HR (ref 0–23)
EST. GFR  (AFRICAN AMERICAN): >60 ML/MIN/1.73 M^2
EST. GFR  (NON AFRICAN AMERICAN): >60 ML/MIN/1.73 M^2
GLUCOSE SERPL-MCNC: 96 MG/DL (ref 70–110)
HCT VFR BLD AUTO: 43.7 % (ref 40–54)
HDLC SERPL-MCNC: 57 MG/DL (ref 40–75)
HDLC SERPL: 21.7 % (ref 20–50)
HGB BLD-MCNC: 14.7 G/DL (ref 14–18)
LDLC SERPL CALC-MCNC: 171 MG/DL (ref 63–159)
MCH RBC QN AUTO: 31.5 PG (ref 27–31)
MCHC RBC AUTO-ENTMCNC: 33.6 G/DL (ref 32–36)
MCV RBC AUTO: 94 FL (ref 82–98)
NONHDLC SERPL-MCNC: 206 MG/DL
PLATELET # BLD AUTO: 198 K/UL (ref 150–450)
PMV BLD AUTO: 9.2 FL (ref 9.2–12.9)
POTASSIUM SERPL-SCNC: 4.2 MMOL/L (ref 3.5–5.1)
PROT SERPL-MCNC: 7.8 G/DL (ref 6–8.4)
RBC # BLD AUTO: 4.66 M/UL (ref 4.6–6.2)
SODIUM SERPL-SCNC: 138 MMOL/L (ref 136–145)
T4 FREE SERPL-MCNC: 0.96 NG/DL (ref 0.71–1.51)
TRIGL SERPL-MCNC: 175 MG/DL (ref 30–150)
TSH SERPL DL<=0.005 MIU/L-ACNC: 5.8 UIU/ML (ref 0.4–4)
WBC # BLD AUTO: 6.09 K/UL (ref 3.9–12.7)

## 2021-07-20 PROCEDURE — 72050 X-RAY EXAM NECK SPINE 4/5VWS: CPT | Mod: TC

## 2021-07-20 PROCEDURE — 3008F PR BODY MASS INDEX (BMI) DOCUMENTED: ICD-10-PCS | Mod: CPTII,S$GLB,, | Performed by: INTERNAL MEDICINE

## 2021-07-20 PROCEDURE — 72050 X-RAY EXAM NECK SPINE 4/5VWS: CPT | Mod: 26,,, | Performed by: RADIOLOGY

## 2021-07-20 PROCEDURE — 85652 RBC SED RATE AUTOMATED: CPT | Performed by: INTERNAL MEDICINE

## 2021-07-20 PROCEDURE — 93010 EKG 12-LEAD: ICD-10-PCS | Mod: S$GLB,,, | Performed by: INTERNAL MEDICINE

## 2021-07-20 PROCEDURE — 85027 COMPLETE CBC AUTOMATED: CPT | Performed by: INTERNAL MEDICINE

## 2021-07-20 PROCEDURE — 1126F AMNT PAIN NOTED NONE PRSNT: CPT | Mod: CPTII,S$GLB,, | Performed by: INTERNAL MEDICINE

## 2021-07-20 PROCEDURE — 1126F PR PAIN SEVERITY QUANTIFIED, NO PAIN PRESENT: ICD-10-PCS | Mod: CPTII,S$GLB,, | Performed by: INTERNAL MEDICINE

## 2021-07-20 PROCEDURE — 99999 PR PBB SHADOW E&M-EST. PATIENT-LVL II: ICD-10-PCS | Mod: PBBFAC,,, | Performed by: INTERNAL MEDICINE

## 2021-07-20 PROCEDURE — 99396 PREV VISIT EST AGE 40-64: CPT | Mod: S$GLB,,, | Performed by: INTERNAL MEDICINE

## 2021-07-20 PROCEDURE — 84153 ASSAY OF PSA TOTAL: CPT | Performed by: INTERNAL MEDICINE

## 2021-07-20 PROCEDURE — 93010 ELECTROCARDIOGRAM REPORT: CPT | Mod: S$GLB,,, | Performed by: INTERNAL MEDICINE

## 2021-07-20 PROCEDURE — 3008F BODY MASS INDEX DOCD: CPT | Mod: CPTII,S$GLB,, | Performed by: INTERNAL MEDICINE

## 2021-07-20 PROCEDURE — 99396 PR PREVENTIVE VISIT,EST,40-64: ICD-10-PCS | Mod: S$GLB,,, | Performed by: INTERNAL MEDICINE

## 2021-07-20 PROCEDURE — 72050 XR CERVICAL SPINE AP LAT WITH FLEX EXTEN: ICD-10-PCS | Mod: 26,,, | Performed by: RADIOLOGY

## 2021-07-20 PROCEDURE — 93005 ELECTROCARDIOGRAM TRACING: CPT | Mod: S$GLB,,, | Performed by: INTERNAL MEDICINE

## 2021-07-20 PROCEDURE — 99999 PR PBB SHADOW E&M-EST. PATIENT-LVL II: CPT | Mod: PBBFAC,,, | Performed by: INTERNAL MEDICINE

## 2021-07-20 PROCEDURE — 84439 ASSAY OF FREE THYROXINE: CPT | Performed by: INTERNAL MEDICINE

## 2021-07-20 PROCEDURE — 93005 EKG 12-LEAD: ICD-10-PCS | Mod: S$GLB,,, | Performed by: INTERNAL MEDICINE

## 2021-07-20 PROCEDURE — 80061 LIPID PANEL: CPT | Performed by: INTERNAL MEDICINE

## 2021-07-20 PROCEDURE — 84443 ASSAY THYROID STIM HORMONE: CPT | Performed by: INTERNAL MEDICINE

## 2021-07-20 PROCEDURE — 80053 COMPREHEN METABOLIC PANEL: CPT | Performed by: INTERNAL MEDICINE

## 2021-07-20 RX ORDER — METHYLPREDNISOLONE 4 MG/1
TABLET ORAL
Qty: 1 PACKAGE | Refills: 0 | Status: SHIPPED | OUTPATIENT
Start: 2021-07-20 | End: 2021-07-21 | Stop reason: SDUPTHER

## 2021-07-21 DIAGNOSIS — J45.901 ASTHMA EXACERBATION IN COPD: Primary | ICD-10-CM

## 2021-07-21 DIAGNOSIS — J44.1 ASTHMA EXACERBATION IN COPD: Primary | ICD-10-CM

## 2021-07-21 RX ORDER — METHYLPREDNISOLONE 4 MG/1
TABLET ORAL
Qty: 1 PACKAGE | Refills: 0 | Status: SHIPPED | OUTPATIENT
Start: 2021-07-21 | End: 2021-08-11

## 2021-09-16 ENCOUNTER — LAB VISIT (OUTPATIENT)
Dept: LAB | Facility: HOSPITAL | Age: 66
End: 2021-09-16
Attending: INTERNAL MEDICINE
Payer: COMMERCIAL

## 2021-09-16 DIAGNOSIS — U07.1 COVID-19 VIRUS INFECTION: Primary | ICD-10-CM

## 2021-09-16 DIAGNOSIS — U07.1 COVID-19 VIRUS INFECTION: ICD-10-CM

## 2021-09-16 LAB
ALBUMIN SERPL BCP-MCNC: 4.1 G/DL (ref 3.5–5.2)
ALP SERPL-CCNC: 52 U/L (ref 55–135)
ALT SERPL W/O P-5'-P-CCNC: 8 U/L (ref 10–44)
ANION GAP SERPL CALC-SCNC: 10 MMOL/L (ref 8–16)
AST SERPL-CCNC: 10 U/L (ref 10–40)
BILIRUB SERPL-MCNC: 0.7 MG/DL (ref 0.1–1)
BUN SERPL-MCNC: 14 MG/DL (ref 8–23)
CALCIUM SERPL-MCNC: 9.5 MG/DL (ref 8.7–10.5)
CHLORIDE SERPL-SCNC: 108 MMOL/L (ref 95–110)
CO2 SERPL-SCNC: 24 MMOL/L (ref 23–29)
CREAT SERPL-MCNC: 1 MG/DL (ref 0.5–1.4)
EST. GFR  (AFRICAN AMERICAN): >60 ML/MIN/1.73 M^2
EST. GFR  (NON AFRICAN AMERICAN): >60 ML/MIN/1.73 M^2
GLUCOSE SERPL-MCNC: 87 MG/DL (ref 70–110)
POTASSIUM SERPL-SCNC: 4.3 MMOL/L (ref 3.5–5.1)
PROT SERPL-MCNC: 7.1 G/DL (ref 6–8.4)
SODIUM SERPL-SCNC: 142 MMOL/L (ref 136–145)

## 2021-09-16 PROCEDURE — 80053 COMPREHEN METABOLIC PANEL: CPT | Performed by: INTERNAL MEDICINE

## 2021-09-16 PROCEDURE — 36415 COLL VENOUS BLD VENIPUNCTURE: CPT | Performed by: INTERNAL MEDICINE

## 2021-09-17 ENCOUNTER — TELEPHONE (OUTPATIENT)
Dept: PULMONOLOGY | Facility: CLINIC | Age: 66
End: 2021-09-17

## 2021-12-20 ENCOUNTER — CLINICAL SUPPORT (OUTPATIENT)
Dept: URGENT CARE | Facility: CLINIC | Age: 66
End: 2021-12-20
Payer: COMMERCIAL

## 2021-12-20 DIAGNOSIS — Z20.822 ENCOUNTER FOR LABORATORY TESTING FOR COVID-19 VIRUS: Primary | ICD-10-CM

## 2021-12-20 LAB
CTP QC/QA: YES
SARS-COV-2 RDRP RESP QL NAA+PROBE: NEGATIVE

## 2021-12-20 PROCEDURE — 99211 OFF/OP EST MAY X REQ PHY/QHP: CPT | Mod: S$GLB,,, | Performed by: EMERGENCY MEDICINE

## 2021-12-20 PROCEDURE — U0002 COVID-19 LAB TEST NON-CDC: HCPCS | Mod: QW,S$GLB,, | Performed by: EMERGENCY MEDICINE

## 2021-12-20 PROCEDURE — U0002: ICD-10-PCS | Mod: QW,S$GLB,, | Performed by: EMERGENCY MEDICINE

## 2021-12-20 PROCEDURE — 99211 PR OFFICE/OUTPT VISIT, EST, LEVL I: ICD-10-PCS | Mod: S$GLB,,, | Performed by: EMERGENCY MEDICINE

## 2022-06-13 DIAGNOSIS — M53.3 SACRAL BACK PAIN: Primary | ICD-10-CM

## 2022-06-13 DIAGNOSIS — M51.36 DDD (DEGENERATIVE DISC DISEASE), LUMBAR: ICD-10-CM

## 2022-06-14 ENCOUNTER — HOSPITAL ENCOUNTER (OUTPATIENT)
Dept: RADIOLOGY | Facility: HOSPITAL | Age: 67
Discharge: HOME | End: 2022-06-14
Attending: ORTHOPAEDIC SURGERY
Payer: COMMERCIAL

## 2022-06-14 ENCOUNTER — OFFICE VISIT (OUTPATIENT)
Dept: ORTHOPEDICS | Facility: CLINIC | Age: 67
End: 2022-06-14
Payer: COMMERCIAL

## 2022-06-14 VITALS — BODY MASS INDEX: 32.65 KG/M2 | HEIGHT: 69 IN | WEIGHT: 220.44 LBS

## 2022-06-14 DIAGNOSIS — M47.816 LUMBAR SPONDYLOSIS: Primary | ICD-10-CM

## 2022-06-14 DIAGNOSIS — M53.3 SACRAL BACK PAIN: ICD-10-CM

## 2022-06-14 DIAGNOSIS — M54.9 DORSALGIA, UNSPECIFIED: ICD-10-CM

## 2022-06-14 DIAGNOSIS — M51.36 DDD (DEGENERATIVE DISC DISEASE), LUMBAR: ICD-10-CM

## 2022-06-14 PROCEDURE — 72220 X-RAY EXAM SACRUM TAILBONE: CPT | Mod: TC

## 2022-06-14 PROCEDURE — 1159F PR MEDICATION LIST DOCUMENTED IN MEDICAL RECORD: ICD-10-PCS | Mod: CPTII,S$GLB,, | Performed by: ORTHOPAEDIC SURGERY

## 2022-06-14 PROCEDURE — 72110 X-RAY EXAM L-2 SPINE 4/>VWS: CPT | Mod: 26,,, | Performed by: RADIOLOGY

## 2022-06-14 PROCEDURE — 1160F RVW MEDS BY RX/DR IN RCRD: CPT | Mod: CPTII,S$GLB,, | Performed by: ORTHOPAEDIC SURGERY

## 2022-06-14 PROCEDURE — 99999 PR PBB SHADOW E&M-EST. PATIENT-LVL III: ICD-10-PCS | Mod: PBBFAC,,, | Performed by: ORTHOPAEDIC SURGERY

## 2022-06-14 PROCEDURE — 1159F MED LIST DOCD IN RCRD: CPT | Mod: CPTII,S$GLB,, | Performed by: ORTHOPAEDIC SURGERY

## 2022-06-14 PROCEDURE — 72220 XR SACRUM AND COCCYX: ICD-10-PCS | Mod: 26,,, | Performed by: RADIOLOGY

## 2022-06-14 PROCEDURE — 3008F BODY MASS INDEX DOCD: CPT | Mod: CPTII,S$GLB,, | Performed by: ORTHOPAEDIC SURGERY

## 2022-06-14 PROCEDURE — 72110 X-RAY EXAM L-2 SPINE 4/>VWS: CPT | Mod: TC

## 2022-06-14 PROCEDURE — 99999 PR PBB SHADOW E&M-EST. PATIENT-LVL III: CPT | Mod: PBBFAC,,, | Performed by: ORTHOPAEDIC SURGERY

## 2022-06-14 PROCEDURE — 72220 X-RAY EXAM SACRUM TAILBONE: CPT | Mod: 26,,, | Performed by: RADIOLOGY

## 2022-06-14 PROCEDURE — 99204 OFFICE O/P NEW MOD 45 MIN: CPT | Mod: S$GLB,,, | Performed by: ORTHOPAEDIC SURGERY

## 2022-06-14 PROCEDURE — 99204 PR OFFICE/OUTPT VISIT, NEW, LEVL IV, 45-59 MIN: ICD-10-PCS | Mod: S$GLB,,, | Performed by: ORTHOPAEDIC SURGERY

## 2022-06-14 PROCEDURE — 72110 XR LUMBAR SPINE AP AND LAT WITH FLEX/EXT: ICD-10-PCS | Mod: 26,,, | Performed by: RADIOLOGY

## 2022-06-14 PROCEDURE — 1160F PR REVIEW ALL MEDS BY PRESCRIBER/CLIN PHARMACIST DOCUMENTED: ICD-10-PCS | Mod: CPTII,S$GLB,, | Performed by: ORTHOPAEDIC SURGERY

## 2022-06-14 PROCEDURE — 3008F PR BODY MASS INDEX (BMI) DOCUMENTED: ICD-10-PCS | Mod: CPTII,S$GLB,, | Performed by: ORTHOPAEDIC SURGERY

## 2022-06-14 RX ORDER — METHYLPREDNISOLONE 4 MG/1
TABLET ORAL
Qty: 21 TABLET | Refills: 0 | Status: SHIPPED | OUTPATIENT
Start: 2022-06-14

## 2022-06-14 RX ORDER — METHOCARBAMOL 500 MG/1
500 TABLET, FILM COATED ORAL 3 TIMES DAILY
Qty: 60 TABLET | Refills: 1 | Status: SHIPPED | OUTPATIENT
Start: 2022-06-14 | End: 2022-08-11 | Stop reason: SDUPTHER

## 2022-06-14 NOTE — PROGRESS NOTES
.  DATE: 6/14/2022  PATIENT: Rashawn Dai    Attending Physician: Trey Simon M.D.    CHIEF COMPLAINT: low back pain    HISTORY:  Rashawn Dai is a 66 y.o. male No significant PMH here for initial evaluation of low back pain. The pain has been present for 1 year. The patient describes the pain as sharp and dull and it does not radiate down buttocks/legs. The pain is worse with movement and improved by nothing. There is no associated numbness and tingling. There is no subjective weakness. Prior treatments have included tylenol, and tramadol, but no KATHERYN, PT, Surgery. He cannot take NSAIDs due to allergy (hives).    The Patient denies myelopathic symptoms such as handwriting changes or difficulty with buttons/coins/keys. Denies perineal paresthesias, bowel/bladder dysfunction.    The patient does not smoke, have DM or endorse IVDU. The patient is not on any blood thinners and does not take chronic narcotics. He works as a .    PAST MEDICAL/SURGICAL HISTORY:  Past Medical History:   Diagnosis Date    Thyroid disease      History reviewed. No pertinent surgical history.    Current Medications:   Current Outpatient Medications:     benzonatate (TESSALON) 100 MG capsule, Take 100 mg by mouth 3 (three) times daily as needed for Cough., Disp: , Rfl:     fluticasone propionate (FLONASE) 50 mcg/actuation nasal spray, 2 sprays once daily., Disp: , Rfl:     levothyroxine (SYNTHROID) 100 MCG tablet, TAKE 1 TABLET BY MOUTH EVERY DAY, Disp: 90 tablet, Rfl: 3    digoxin (LANOXIN) 125 mcg tablet, Take 1 tablet (0.125 mg total) by mouth once daily., Disp: 30 tablet, Rfl: 0    methocarbamoL (ROBAXIN) 500 MG Tab, Take 1 tablet (500 mg total) by mouth 3 (three) times daily., Disp: 60 tablet, Rfl: 1    methylPREDNISolone (MEDROL DOSEPACK) 4 mg tablet, use as directed, Disp: 21 tablet, Rfl: 0    metoprolol succinate (TOPROL-XL) 25 MG 24 hr tablet, Take 1 tablet (25 mg total) by mouth once daily., Disp: 30  "tablet, Rfl: 0    Social History:   Social History     Socioeconomic History    Marital status:    Tobacco Use    Smoking status: Never Smoker    Smokeless tobacco: Never Used   Substance and Sexual Activity    Alcohol use: Yes     Alcohol/week: 23.5 standard drinks     Types: 21 Glasses of wine, 3 Standard drinks or equivalent per week    Drug use: No       REVIEW OF SYSTEMS:  Constitution: Negative. Negative for chills, fever and night sweats.   Cardiovascular: Negative for chest pain and syncope.   Respiratory: Negative for cough and shortness of breath.   Gastrointestinal: See HPI. Negative for nausea/vomiting. Negative for abdominal pain.  Genitourinary: See HPI. Negative for discoloration or dysuria.  Hematologic/Lymphatic: Neg for bleeding/clotting disorders.   Musculoskeletal: Negative for falls and muscle weakness.   Neurological: See HPI. No history of seizures. No history of cranial surgery or shunts.  Neurological: See HPI. No seizures.   Endocrine: Negative for polydipsia, polyphagia and polyuria.   Allergic/Immunologic: Negative for hives and persistent infections.     EXAM:  Ht 5' 9" (1.753 m)   Wt 100 kg (220 lb 7.4 oz)   BMI 32.56 kg/m²     PHYSICAL EXAMINATION:    General: The patient is a WDWN 66 y.o. male in no apparent distress, the patient is orientatied to person, place and time.  Psych: Normal mood and affect  HEENT: Vision grossly intact, hearing intact to the spoken word.  Lungs: Respirations unlabored.  Gait: Normal station and gait, no difficulty with toe or heel walk.   Skin: Dorsal lumbar skin negative for rashes, lesions, hairy patches and surgical scars. There is mild lumbar tenderness to palpation.  Range of motion: Lumbar range of motion is acceptable.  Spinal Balance: Global saggital and coronal spinal balance acceptable, no significant for scoliosis and kyphosis.  Musculoskeletal: No pain with the range of motion of the bilateral hips. No trochanteric tenderness to " palpation. Mild point tenderness over L4/L5 at the midline   Vascular: Bilateral lower extremities warm and well perfused, Dorsalis pedis pulses 2+ bilaterally.  Neurological: Normal strength and tone in all major motor groups in the bilateral lower extremities. Normal sensation to light touch in the L2-S1 dermatomes bilaterally.  Deep tendon reflexes symmetric 2+ in the bilateral lower extremities.  Negative Babinski bilaterally. Straight leg raise negative bilaterally.    IMAGING:   Today I independently reviewed the following images and my interpretations are as follows:    AP, Lat and Flex/Ex  upright L-spine demonstrate early DDD at L5/S1.    Body mass index is 32.56 kg/m².  Hemoglobin A1C   Date Value Ref Range Status   06/05/2018 5.1 4.0 - 5.6 % Final     Comment:     ADA Screening Guidelines:  5.7-6.4%  Consistent with prediabetes  >or=6.5%  Consistent with diabetes  High levels of fetal hemoglobin interfere with the HbA1C  assay. Heterozygous hemoglobin variants (HbS, HgC, etc)do  not significantly interfere with this assay.   However, presence of multiple variants may affect accuracy.     09/29/2016 5.3 4.5 - 6.2 % Final     Comment:     According to ADA guidelines, hemoglobin A1C <7.0% represents  optimal control in non-pregnant diabetic patients.  Different  metrics may apply to specific populations.   Standards of Medical Care in Diabetes - 2016.  For the purpose of screening for the presence of diabetes:  <5.7%     Consistent with the absence of diabetes  5.7-6.4%  Consistent with increasing risk for diabetes   (prediabetes)  >or=6.5%  Consistent with diabetes  Currently no consensus exists for use of hemoglobin A1C  for diagnosis of diabetes for children.     09/22/2015 5.2 4.5 - 6.2 % Final       ASSESSMENT/PLAN:    Rashawn was seen today for pain.    Diagnoses and all orders for this visit:    Lumbar spondylosis  -     MRI Lumbar Spine Without Contrast; Future  -     methocarbamoL (ROBAXIN) 500 MG  Tab; Take 1 tablet (500 mg total) by mouth 3 (three) times daily.  -     methylPREDNISolone (MEDROL DOSEPACK) 4 mg tablet; use as directed    DDD (degenerative disc disease), lumbar    Dorsalgia, unspecified  -     MRI Lumbar Spine Without Contrast; Future      Follow up in about 2 weeks (around 6/28/2022).    Patient has midline low back pain without radiculopathy. I discussed the natural history of their diagnoses as well as surgical and nonsurgical treatment options. I educated the patient on the importance of core/back strengthening, correct posture, bending/lifting ergonomics, and low-impact aerobic exercises (walking, elliptical, and aquatherapy). I prescribed robaxin and medrol dose pack. I will refer the patient to imaging for MRI. Patient will follow up in 2w for MRI review. Given home exercise sheet.    I provided the patient with a home exercise program. It is the [employer] spine conditioning program. Exercises include head rolls, kneeling back extension, sitting rotation stretch, modified seated side straddle, knee to chest, bird dog, plank, modified seated plank, hip bridges, abdominal bracing, and abdominal crunch. Pt will complete each exercise 5 times daily for 6-8 weeks.    I have personally examined the patient and agree with the above plan.    Trey Simon MD  Orthopaedic Spine Surgeon  Department of Orthopaedic Surgery  [phone]

## 2022-06-21 ENCOUNTER — PATIENT MESSAGE (OUTPATIENT)
Dept: ORTHOPEDICS | Facility: CLINIC | Age: 67
End: 2022-06-21
Payer: COMMERCIAL

## 2022-06-21 DIAGNOSIS — M47.816 LUMBAR SPONDYLOSIS: Primary | ICD-10-CM

## 2022-08-11 DIAGNOSIS — M47.816 LUMBAR SPONDYLOSIS: ICD-10-CM

## 2022-08-11 RX ORDER — METHOCARBAMOL 500 MG/1
500 TABLET, FILM COATED ORAL 3 TIMES DAILY
Qty: 60 TABLET | Refills: 1 | Status: SHIPPED | OUTPATIENT
Start: 2022-08-11 | End: 2023-02-14

## 2022-08-11 NOTE — TELEPHONE ENCOUNTER
Spoke to patient yesterday, states he had his MRI. I told him I saw the report in [facility]. I asked if he could bring the disk so  could view the images and we could download it into the system. He is going to bring the disk to . Also, requested refill of robaxin.

## 2023-07-17 DIAGNOSIS — M47.816 LUMBAR SPONDYLOSIS: ICD-10-CM

## 2023-07-17 RX ORDER — METHOCARBAMOL 500 MG/1
TABLET, FILM COATED ORAL
Qty: 60 TABLET | Refills: 1 | OUTPATIENT
Start: 2023-07-17

## 2023-10-11 NOTE — PROGRESS NOTES
Patient not examined today.  Reviewed chart, labs and telemetry strips.  Heart rate is fairly controlled between 60 and 90 at rest.  Oxygen saturations 96% on 3 L.  I have discussed the case with ,  hospitalist.  She will determine if the patient is ready to go home.  If going home aspirin 81 mg daily, metoprolol succinate 25 mg daily, digoxin 0.125 daily.  Follow-up with me in 3 weeks.  My impression is that he does not have direct cardiac involvement with COVID.  Atrial fibrillation is secondary to bilateral interstitial pneumonia and expected to improve with improvement in pneumonia.  In 3 weeks if he remains in atrial fibrillation we will consider cardioversion.   left

## 2023-11-16 ENCOUNTER — NURSE TRIAGE (OUTPATIENT)
Dept: ADMINISTRATIVE | Facility: CLINIC | Age: 68
End: 2023-11-16
Payer: COMMERCIAL

## 2023-11-16 NOTE — TELEPHONE ENCOUNTER
Reason for Disposition   Chest pain lasting longer than 5 minutes and over 44 years old    Additional Information   Negative: SEVERE difficulty breathing (e.g., struggling for each breath, speaks in single words)   Negative: Difficult to awaken or acting confused (e.g., disoriented, slurred speech)   Negative: Shock suspected (e.g., cold/pale/clammy skin, too weak to stand, low BP, rapid pulse)   Negative: Passed out (i.e., lost consciousness, collapsed and was not responding)    Protocols used: Chest Pain-A-OH  Pt states he has mild chest pain present longer than 5 minutes. Pt advised to dial 911 for EMS. Pt refused stating he'll go to the ED. Pt advised for his safety he should call 911. He verbalized understanding.

## 2023-11-27 ENCOUNTER — TELEPHONE (OUTPATIENT)
Dept: PULMONOLOGY | Facility: CLINIC | Age: 68
End: 2023-11-27
Payer: COMMERCIAL

## 2023-11-27 NOTE — TELEPHONE ENCOUNTER
Spoke with pt, informed him that I have received his message. Pt states that he spoke with Ms Oliva in regards to seeing if patient is eligible for [facility] and is going to call pt once she gets advised. I verbalized to pt that I understand.

## 2023-11-27 NOTE — TELEPHONE ENCOUNTER
----- Message from Kar Castillo sent at 11/27/2023  1:34 PM CST -----  Regarding: appt  Contact: [phone]  Pt calling to get established  with another provider as well as get a refill on levothyroxine (SYNTHROID) 100 MCG tablet states completely out ... Pt is established with md hollins ....   Pls call and adv@[phone]

## 2023-11-28 DIAGNOSIS — E03.9 ACQUIRED HYPOTHYROIDISM: ICD-10-CM

## 2023-11-29 RX ORDER — LEVOTHYROXINE SODIUM 100 UG/1
100 TABLET ORAL DAILY
Qty: 90 TABLET | Refills: 0 | Status: SHIPPED | OUTPATIENT
Start: 2023-11-29 | End: 2024-03-05

## 2024-02-29 DIAGNOSIS — E03.9 ACQUIRED HYPOTHYROIDISM: ICD-10-CM

## 2024-03-05 RX ORDER — LEVOTHYROXINE SODIUM 100 UG/1
100 TABLET ORAL
Qty: 90 TABLET | Refills: 0 | Status: SHIPPED | OUTPATIENT
Start: 2024-03-05 | End: 2024-06-19

## 2024-06-14 DIAGNOSIS — E03.9 ACQUIRED HYPOTHYROIDISM: ICD-10-CM

## 2024-06-14 RX ORDER — LEVOTHYROXINE SODIUM 100 UG/1
100 TABLET ORAL
Qty: 90 TABLET | Refills: 0 | OUTPATIENT
Start: 2024-06-14

## 2024-06-18 DIAGNOSIS — E03.9 ACQUIRED HYPOTHYROIDISM: ICD-10-CM

## 2024-06-19 RX ORDER — LEVOTHYROXINE SODIUM 100 UG/1
100 TABLET ORAL
Qty: 90 TABLET | Refills: 3 | Status: SHIPPED | OUTPATIENT
Start: 2024-06-19